# Patient Record
Sex: FEMALE | Race: OTHER | Employment: UNEMPLOYED | URBAN - NONMETROPOLITAN AREA
[De-identification: names, ages, dates, MRNs, and addresses within clinical notes are randomized per-mention and may not be internally consistent; named-entity substitution may affect disease eponyms.]

---

## 2018-05-14 ENCOUNTER — HOSPITAL ENCOUNTER (EMERGENCY)
Age: 28
Discharge: HOME | End: 2018-05-14

## 2018-05-14 ENCOUNTER — APPOINTMENT (OUTPATIENT)
Dept: ULTRASOUND IMAGING | Age: 28
End: 2018-05-14

## 2018-05-14 VITALS
OXYGEN SATURATION: 99 % | HEIGHT: 55 IN | BODY MASS INDEX: 38.18 KG/M2 | RESPIRATION RATE: 16 BRPM | WEIGHT: 165 LBS | DIASTOLIC BLOOD PRESSURE: 69 MMHG | SYSTOLIC BLOOD PRESSURE: 133 MMHG | HEART RATE: 94 BPM | TEMPERATURE: 98.7 F

## 2018-05-14 DIAGNOSIS — K80.20 CALCULUS OF GALLBLADDER WITHOUT CHOLECYSTITIS WITHOUT OBSTRUCTION: Primary | ICD-10-CM

## 2018-05-14 LAB
ALBUMIN SERPL-MCNC: 4.4 G/DL (ref 3.5–5.1)
ALP BLD-CCNC: 68 U/L (ref 38–126)
ALT SERPL-CCNC: 24 U/L (ref 11–66)
ANION GAP SERPL CALCULATED.3IONS-SCNC: 16 MEQ/L (ref 8–16)
ANISOCYTOSIS: ABNORMAL
AST SERPL-CCNC: 21 U/L (ref 5–40)
BACTERIA: ABNORMAL /HPF
BASOPHILIA: SLIGHT
BASOPHILS # BLD: 0.5 %
BASOPHILS ABSOLUTE: 0 THOU/MM3 (ref 0–0.1)
BILIRUB SERPL-MCNC: 0.2 MG/DL (ref 0.3–1.2)
BILIRUBIN DIRECT: < 0.2 MG/DL (ref 0–0.3)
BILIRUBIN URINE: NEGATIVE
BLOOD, URINE: NEGATIVE
BUN BLDV-MCNC: 8 MG/DL (ref 7–22)
CALCIUM SERPL-MCNC: 9 MG/DL (ref 8.5–10.5)
CASTS 2: ABNORMAL /LPF
CASTS UA: ABNORMAL /LPF
CHARACTER, URINE: CLEAR
CHLORIDE BLD-SCNC: 101 MEQ/L (ref 98–111)
CO2: 21 MEQ/L (ref 23–33)
COLOR: YELLOW
CREAT SERPL-MCNC: 0.5 MG/DL (ref 0.4–1.2)
CRYSTALS, UA: ABNORMAL
EOSINOPHIL # BLD: 2.6 %
EOSINOPHILS ABSOLUTE: 0.2 THOU/MM3 (ref 0–0.4)
EPITHELIAL CELLS, UA: ABNORMAL /HPF
GFR SERPL CREATININE-BSD FRML MDRD: > 90 ML/MIN/1.73M2
GLUCOSE BLD-MCNC: 90 MG/DL (ref 70–108)
GLUCOSE URINE: NEGATIVE MG/DL
HCT VFR BLD CALC: 38.4 % (ref 37–47)
HEMOGLOBIN: 12.8 GM/DL (ref 12–16)
KETONES, URINE: NEGATIVE
LEUKOCYTE ESTERASE, URINE: ABNORMAL
LIPASE: 41.2 U/L (ref 5.6–51.3)
LYMPHOCYTES # BLD: 19.8 %
LYMPHOCYTES ABSOLUTE: 1.9 THOU/MM3 (ref 1–4.8)
MCH RBC QN AUTO: 27.1 PG (ref 27–31)
MCHC RBC AUTO-ENTMCNC: 33.2 GM/DL (ref 33–37)
MCV RBC AUTO: 81.7 FL (ref 81–99)
MISCELLANEOUS 2: ABNORMAL
MONOCYTES # BLD: 7.8 %
MONOCYTES ABSOLUTE: 0.7 THOU/MM3 (ref 0.4–1.3)
NITRITE, URINE: NEGATIVE
NUCLEATED RED BLOOD CELLS: 0 /100 WBC
OSMOLALITY CALCULATION: 273.5 MOSMOL/KG (ref 275–300)
PDW BLD-RTO: 17.1 % (ref 11.5–14.5)
PH UA: 6.5
PLATELET # BLD: 256 THOU/MM3 (ref 130–400)
PLATELET ESTIMATE: ADEQUATE
PMV BLD AUTO: 9.7 FL (ref 7.4–10.4)
POIKILOCYTES: SLIGHT
POTASSIUM SERPL-SCNC: 4.1 MEQ/L (ref 3.5–5.2)
PROTEIN UA: NEGATIVE
RBC # BLD: 4.7 MILL/MM3 (ref 4.2–5.4)
RBC URINE: ABNORMAL /HPF
RENAL EPITHELIAL, UA: ABNORMAL
SCAN OF BLOOD SMEAR: NORMAL
SEG NEUTROPHILS: 69.3 %
SEGMENTED NEUTROPHILS ABSOLUTE COUNT: 6.7 THOU/MM3 (ref 1.8–7.7)
SODIUM BLD-SCNC: 138 MEQ/L (ref 135–145)
SPECIFIC GRAVITY, URINE: 1.01 (ref 1–1.03)
TOTAL PROTEIN: 7.5 G/DL (ref 6.1–8)
UROBILINOGEN, URINE: 0.2 EU/DL
WBC # BLD: 9.6 THOU/MM3 (ref 4.8–10.8)
WBC UA: ABNORMAL /HPF
YEAST: ABNORMAL

## 2018-05-14 PROCEDURE — 99283 EMERGENCY DEPT VISIT LOW MDM: CPT

## 2018-05-14 PROCEDURE — 81001 URINALYSIS AUTO W/SCOPE: CPT

## 2018-05-14 PROCEDURE — 87086 URINE CULTURE/COLONY COUNT: CPT

## 2018-05-14 PROCEDURE — 85025 COMPLETE CBC W/AUTO DIFF WBC: CPT

## 2018-05-14 PROCEDURE — 36415 COLL VENOUS BLD VENIPUNCTURE: CPT

## 2018-05-14 PROCEDURE — 82248 BILIRUBIN DIRECT: CPT

## 2018-05-14 PROCEDURE — 83690 ASSAY OF LIPASE: CPT

## 2018-05-14 PROCEDURE — 76705 ECHO EXAM OF ABDOMEN: CPT

## 2018-05-14 PROCEDURE — 80053 COMPREHEN METABOLIC PANEL: CPT

## 2018-05-14 RX ORDER — DICYCLOMINE HYDROCHLORIDE 10 MG/1
10 CAPSULE ORAL EVERY 6 HOURS PRN
Qty: 20 CAPSULE | Refills: 0 | Status: SHIPPED | OUTPATIENT
Start: 2018-05-14 | End: 2018-10-29 | Stop reason: ALTCHOICE

## 2018-05-14 RX ORDER — ONDANSETRON 4 MG/1
4 TABLET, ORALLY DISINTEGRATING ORAL EVERY 8 HOURS PRN
Qty: 20 TABLET | Refills: 0 | Status: SHIPPED | OUTPATIENT
Start: 2018-05-14 | End: 2018-10-29 | Stop reason: ALTCHOICE

## 2018-05-14 ASSESSMENT — ENCOUNTER SYMPTOMS
RHINORRHEA: 0
EYE DISCHARGE: 0
WHEEZING: 0
COUGH: 0
VOMITING: 0
SHORTNESS OF BREATH: 0
BACK PAIN: 0
NAUSEA: 0
DIARRHEA: 0
SORE THROAT: 0

## 2018-05-14 ASSESSMENT — PAIN DESCRIPTION - FREQUENCY: FREQUENCY: INTERMITTENT

## 2018-05-14 ASSESSMENT — PAIN DESCRIPTION - PAIN TYPE: TYPE: ACUTE PAIN

## 2018-05-14 ASSESSMENT — PAIN DESCRIPTION - ORIENTATION: ORIENTATION: RIGHT

## 2018-05-14 ASSESSMENT — PAIN DESCRIPTION - LOCATION: LOCATION: BREAST

## 2018-05-14 ASSESSMENT — PAIN SCALES - GENERAL: PAINLEVEL_OUTOF10: 4

## 2018-05-15 LAB
ORGANISM: ABNORMAL
URINE CULTURE REFLEX: ABNORMAL

## 2018-05-15 ASSESSMENT — ENCOUNTER SYMPTOMS
EYE REDNESS: 0
CONSTIPATION: 0
COLOR CHANGE: 0
ABDOMINAL PAIN: 1

## 2018-10-29 ENCOUNTER — OFFICE VISIT (OUTPATIENT)
Dept: SURGERY | Age: 28
End: 2018-10-29

## 2018-10-29 VITALS
OXYGEN SATURATION: 98 % | DIASTOLIC BLOOD PRESSURE: 60 MMHG | SYSTOLIC BLOOD PRESSURE: 110 MMHG | HEART RATE: 99 BPM | RESPIRATION RATE: 18 BRPM | WEIGHT: 196.8 LBS | TEMPERATURE: 98.2 F | BODY MASS INDEX: 45.55 KG/M2 | HEIGHT: 55 IN

## 2018-10-29 DIAGNOSIS — Z3A.30 30 WEEKS GESTATION OF PREGNANCY: ICD-10-CM

## 2018-10-29 DIAGNOSIS — K80.20 CALCULUS OF GALLBLADDER WITHOUT CHOLECYSTITIS WITHOUT OBSTRUCTION: Primary | ICD-10-CM

## 2018-10-29 PROCEDURE — 99242 OFF/OP CONSLTJ NEW/EST SF 20: CPT | Performed by: SURGERY

## 2018-10-29 RX ORDER — ACETAMINOPHEN 325 MG/1
650 TABLET ORAL EVERY 6 HOURS PRN
COMMUNITY
End: 2019-04-25 | Stop reason: ALTCHOICE

## 2018-10-29 NOTE — LETTER
[address] [facility] Surgery  [facility] [address] [address]  [address] [address]  Phone: [phone]  Fax: [phone]    Mellissa Ace MD        October 30, 2018    Patient: Dimple Chavarria   MR Number: [id number]   YOB: 1990   Date of Visit: 10/29/2018     Dear Dr. Lisa Caceres,    Thank you for the request for consultation for Dimple Chavarria to me for the evaluation of cholelithiasis. Below are the relevant portions of my assessment and plan of care. 1. Calculus of gallbladder without cholecystitis without obstruction    2. 30 weeks gestation of pregnancy           1. Patient is intermittently symptomatic with biliary colic. Only if she eats fatty foods. 2.  Recommend avoidance of triggering foods. 3.  Don't think cholecystectomy is warranted at this time in her pregnancy. 4.  Follow-up surgical clinic approximately 1 month postpartum. 5.  Patient instructed to call our office if develops increasing symptoms of biliary colic. If you have questions, please do not hesitate to call me. I look forward to following Yoselin along with you.     Sincerely,          Mellissa Ace MD

## 2018-10-30 ASSESSMENT — ENCOUNTER SYMPTOMS
BLOOD IN STOOL: 0
TROUBLE SWALLOWING: 0
SHORTNESS OF BREATH: 0
VOMITING: 0
ABDOMINAL PAIN: 0
WHEEZING: 0
COLOR CHANGE: 0
COUGH: 0
NAUSEA: 0
VOICE CHANGE: 0
SORE THROAT: 0

## 2018-10-30 NOTE — PROGRESS NOTES
Kiki Cobian MD   General Surgery  New Patient Evaluation in Office  Pt Name: Nikita Bobby  Date of Birth 1990   Today's Date: 10/29/2018  Medical Record Number: [id number]  Referring Provider: Doyle Elam MD  Primary [facility], APRN - [facility]  Chief Complaint:  Chief Complaint   Patient presents with   [address] [place] [address] Surgical Consult     new pt-ref. Dr. Krish Walsh in Jan- Multiple echogenic foci concerning for gallstones       ASSESSMENT      1. Calculus of gallbladder without cholecystitis without obstruction    2. 30 weeks gestation of pregnancy         PLANS      1. Patient is intermittently symptomatic with biliary colic. Only if she eats fatty foods. 2.  Recommend avoidance of triggering foods. 3.  Don't think cholecystectomy is warranted at this time in her pregnancy. 4.  Follow-up surgical clinic approximately 1 month postpartum. 5.  Patient instructed to call our office if develops increasing symptoms of biliary colic. Rosa Alexander is a 25y.o. year old female who is presenting today in the office for evaluation of cholelithiasis. The patient is 29-30 weeks pregnant with intrauterine pregnancy. She has had some intermittent episodes of what sounds like biliary colic with some epigastric pain and radiation to the right back. Gallbladder ultrasound performed several months ago revealed cholelithiasis but no signs of biliary duct dilation or cholecystitis. She states she has no symptoms if she simply avoids fatty foods. She denies any dark urine or lighter colored bowel movements. She has no significant reflux symptoms. She denies any prior history of hepatitis or jaundice. She denies any abdominal pain in the last 2 weeks. Pain when it occurs describes as pressure type sharp sensation in the epigastrium without real radiation. She denies any prior known history of peptic ulcer disease, pancreatitis or hepatitis.   She states she is gaining RUQ pain worse after eating.       COMPARISON: No prior study.       TECHNIQUE:Real-time transabdominal ultrasound was performed.       FINDINGS:   Liver is echogenic most commonly seen with fatty infiltration. Limited valuation of the pancreas due to overlying bowel gas. Where visualized the pancreas is within normal limits. Gallbladder - 6.0 x 2.0 x 1.6 cm partially contracted gallbladder. Multiple gallstones. Gallbladder Wall - 0.4 cm   Common Duct - 0.5 cm   Hansen's Sign: neg           Impression       Partially contracted gallbladder. Multiple echogenic foci concerning for gallstones. Correlation with serology advised.               **This report has been created using voice recognition software. It may contain minor errors which are inherent in voice recognition technology. **       Final report electronically signed by Dr. Tyra Syed on 5/14/2018 2:32 PM

## 2018-12-18 ENCOUNTER — ANESTHESIA (OUTPATIENT)
Dept: LABOR AND DELIVERY | Age: 28
End: 2018-12-18

## 2018-12-18 ENCOUNTER — HOSPITAL ENCOUNTER (INPATIENT)
Age: 28
LOS: 2 days | Discharge: HOME | End: 2018-12-20
Attending: OBSTETRICS & GYNECOLOGY | Admitting: OBSTETRICS & GYNECOLOGY

## 2018-12-18 ENCOUNTER — ANESTHESIA EVENT (OUTPATIENT)
Dept: LABOR AND DELIVERY | Age: 28
End: 2018-12-18

## 2018-12-18 PROBLEM — O09.90 SUPERVISION OF HIGH-RISK PREGNANCY: Status: ACTIVE | Noted: 2018-12-18

## 2018-12-18 LAB
ABO: NORMAL
AMPHETAMINE+METHAMPHETAMINE URINE SCREEN: NEGATIVE
ANTIBODY SCREEN: NORMAL
BARBITURATE QUANTITATIVE URINE: NEGATIVE
BENZODIAZEPINE QUANTITATIVE URINE: NEGATIVE
CANNABINOID QUANTITATIVE URINE: NEGATIVE
COCAINE METABOLITE QUANTITATIVE URINE: NEGATIVE
ERYTHROCYTE [DISTWIDTH] IN BLOOD BY AUTOMATED COUNT: 19.6 % (ref 11.5–14.5)
ERYTHROCYTE [DISTWIDTH] IN BLOOD BY AUTOMATED COUNT: 56.5 FL (ref 35–45)
HCT VFR BLD CALC: 35.8 % (ref 37–47)
HEMOGLOBIN: 11.5 GM/DL (ref 12–16)
MCH RBC QN AUTO: 26 PG (ref 26–33)
MCHC RBC AUTO-ENTMCNC: 32.1 GM/DL (ref 32.2–35.5)
MCV RBC AUTO: 81 FL (ref 81–99)
OPIATES, URINE: NEGATIVE
OXYCODONE: NEGATIVE
PHENCYCLIDINE QUANTITATIVE URINE: NEGATIVE
PLATELET # BLD: 198 THOU/MM3 (ref 130–400)
PMV BLD AUTO: 13 FL (ref 9.4–12.4)
RBC # BLD: 4.42 MILL/MM3 (ref 4.2–5.4)
RH FACTOR: NORMAL
WBC # BLD: 11.2 THOU/MM3 (ref 4.8–10.8)

## 2018-12-18 PROCEDURE — 2709999900 HC NON-CHARGEABLE SUPPLY

## 2018-12-18 PROCEDURE — 3700000025 ANESTHESIA EPIDURAL BLOCK: Performed by: ANESTHESIOLOGY

## 2018-12-18 PROCEDURE — 86900 BLOOD TYPING SEROLOGIC ABO: CPT

## 2018-12-18 PROCEDURE — 6360000002 HC RX W HCPCS: Performed by: REGISTERED NURSE

## 2018-12-18 PROCEDURE — 85027 COMPLETE CBC AUTOMATED: CPT

## 2018-12-18 PROCEDURE — 2500000003 HC RX 250 WO HCPCS: Performed by: ANESTHESIOLOGY

## 2018-12-18 PROCEDURE — 4A1HXCZ MONITORING OF PRODUCTS OF CONCEPTION, CARDIAC RATE, EXTERNAL APPROACH: ICD-10-PCS | Performed by: OBSTETRICS & GYNECOLOGY

## 2018-12-18 PROCEDURE — 7200000001 HC VAGINAL DELIVERY

## 2018-12-18 PROCEDURE — 86850 RBC ANTIBODY SCREEN: CPT

## 2018-12-18 PROCEDURE — 3E033VJ INTRODUCTION OF OTHER HORMONE INTO PERIPHERAL VEIN, PERCUTANEOUS APPROACH: ICD-10-PCS | Performed by: OBSTETRICS & GYNECOLOGY

## 2018-12-18 PROCEDURE — 6370000000 HC RX 637 (ALT 250 FOR IP): Performed by: OBSTETRICS & GYNECOLOGY

## 2018-12-18 PROCEDURE — 88307 TISSUE EXAM BY PATHOLOGIST: CPT

## 2018-12-18 PROCEDURE — 2580000003 HC RX 258: Performed by: OBSTETRICS & GYNECOLOGY

## 2018-12-18 PROCEDURE — 0KQM0ZZ REPAIR PERINEUM MUSCLE, OPEN APPROACH: ICD-10-PCS | Performed by: OBSTETRICS & GYNECOLOGY

## 2018-12-18 PROCEDURE — 86592 SYPHILIS TEST NON-TREP QUAL: CPT

## 2018-12-18 PROCEDURE — 6360000002 HC RX W HCPCS

## 2018-12-18 PROCEDURE — 80307 DRUG TEST PRSMV CHEM ANLYZR: CPT

## 2018-12-18 PROCEDURE — 86901 BLOOD TYPING SEROLOGIC RH(D): CPT

## 2018-12-18 PROCEDURE — 1220000000 HC SEMI PRIVATE OB R&B

## 2018-12-18 PROCEDURE — 6360000002 HC RX W HCPCS: Performed by: OBSTETRICS & GYNECOLOGY

## 2018-12-18 PROCEDURE — 36415 COLL VENOUS BLD VENIPUNCTURE: CPT

## 2018-12-18 RX ORDER — FENTANYL CITRATE 50 UG/ML
INJECTION, SOLUTION INTRAMUSCULAR; INTRAVENOUS
Status: COMPLETED
Start: 2018-12-18 | End: 2018-12-18

## 2018-12-18 RX ORDER — TERBUTALINE SULFATE 1 MG/ML
0.25 INJECTION, SOLUTION SUBCUTANEOUS ONCE
Status: DISCONTINUED | OUTPATIENT
Start: 2018-12-18 | End: 2018-12-18 | Stop reason: HOSPADM

## 2018-12-18 RX ORDER — METOCLOPRAMIDE HYDROCHLORIDE 5 MG/ML
INJECTION INTRAMUSCULAR; INTRAVENOUS
Status: DISCONTINUED
Start: 2018-12-18 | End: 2018-12-19

## 2018-12-18 RX ORDER — CARBOPROST TROMETHAMINE 250 UG/ML
250 INJECTION, SOLUTION INTRAMUSCULAR PRN
Status: DISCONTINUED | OUTPATIENT
Start: 2018-12-18 | End: 2018-12-18 | Stop reason: HOSPADM

## 2018-12-18 RX ORDER — NALOXONE HYDROCHLORIDE 0.4 MG/ML
0.4 INJECTION, SOLUTION INTRAMUSCULAR; INTRAVENOUS; SUBCUTANEOUS PRN
Status: DISCONTINUED | OUTPATIENT
Start: 2018-12-18 | End: 2018-12-18 | Stop reason: HOSPADM

## 2018-12-18 RX ORDER — ACETAMINOPHEN 325 MG/1
650 TABLET ORAL EVERY 4 HOURS PRN
Status: DISCONTINUED | OUTPATIENT
Start: 2018-12-18 | End: 2018-12-18 | Stop reason: HOSPADM

## 2018-12-18 RX ORDER — SODIUM CHLORIDE, SODIUM LACTATE, POTASSIUM CHLORIDE, CALCIUM CHLORIDE 600; 310; 30; 20 MG/100ML; MG/100ML; MG/100ML; MG/100ML
INJECTION, SOLUTION INTRAVENOUS CONTINUOUS
Status: DISCONTINUED | OUTPATIENT
Start: 2018-12-18 | End: 2018-12-19

## 2018-12-18 RX ORDER — MISOPROSTOL 200 UG/1
1000 TABLET ORAL PRN
Status: DISCONTINUED | OUTPATIENT
Start: 2018-12-18 | End: 2018-12-18 | Stop reason: HOSPADM

## 2018-12-18 RX ORDER — FENTANYL CITRATE 50 UG/ML
INJECTION, SOLUTION INTRAMUSCULAR; INTRAVENOUS PRN
Status: DISCONTINUED | OUTPATIENT
Start: 2018-12-18 | End: 2018-12-18 | Stop reason: SDUPTHER

## 2018-12-18 RX ORDER — ONDANSETRON 2 MG/ML
8 INJECTION INTRAMUSCULAR; INTRAVENOUS EVERY 6 HOURS PRN
Status: DISCONTINUED | OUTPATIENT
Start: 2018-12-18 | End: 2018-12-18 | Stop reason: HOSPADM

## 2018-12-18 RX ORDER — NALBUPHINE HCL 10 MG/ML
5 AMPUL (ML) INJECTION EVERY 4 HOURS PRN
Status: DISCONTINUED | OUTPATIENT
Start: 2018-12-18 | End: 2018-12-18 | Stop reason: HOSPADM

## 2018-12-18 RX ORDER — ROPIVACAINE HYDROCHLORIDE 2 MG/ML
INJECTION, SOLUTION EPIDURAL; INFILTRATION; PERINEURAL
Status: DISCONTINUED
Start: 2018-12-18 | End: 2018-12-19

## 2018-12-18 RX ORDER — DIPHENHYDRAMINE HYDROCHLORIDE 50 MG/ML
25 INJECTION INTRAMUSCULAR; INTRAVENOUS EVERY 4 HOURS PRN
Status: DISCONTINUED | OUTPATIENT
Start: 2018-12-18 | End: 2018-12-18 | Stop reason: HOSPADM

## 2018-12-18 RX ORDER — ONDANSETRON 2 MG/ML
4 INJECTION INTRAMUSCULAR; INTRAVENOUS EVERY 6 HOURS PRN
Status: DISCONTINUED | OUTPATIENT
Start: 2018-12-18 | End: 2018-12-18 | Stop reason: HOSPADM

## 2018-12-18 RX ORDER — METHYLERGONOVINE MALEATE 0.2 MG/ML
200 INJECTION INTRAVENOUS PRN
Status: DISCONTINUED | OUTPATIENT
Start: 2018-12-18 | End: 2018-12-18 | Stop reason: HOSPADM

## 2018-12-18 RX ORDER — SEVOFLURANE 250 ML/250ML
1 LIQUID RESPIRATORY (INHALATION) CONTINUOUS PRN
Status: DISCONTINUED | OUTPATIENT
Start: 2018-12-18 | End: 2018-12-18 | Stop reason: HOSPADM

## 2018-12-18 RX ORDER — BUTORPHANOL TARTRATE 1 MG/ML
1 INJECTION, SOLUTION INTRAMUSCULAR; INTRAVENOUS
Status: DISCONTINUED | OUTPATIENT
Start: 2018-12-18 | End: 2018-12-18 | Stop reason: HOSPADM

## 2018-12-18 RX ORDER — IBUPROFEN 800 MG/1
800 TABLET ORAL EVERY 8 HOURS
Status: DISCONTINUED | OUTPATIENT
Start: 2018-12-18 | End: 2018-12-20 | Stop reason: HOSPADM

## 2018-12-18 RX ORDER — LIDOCAINE HYDROCHLORIDE 10 MG/ML
30 INJECTION, SOLUTION EPIDURAL; INFILTRATION; INTRACAUDAL; PERINEURAL PRN
Status: DISCONTINUED | OUTPATIENT
Start: 2018-12-18 | End: 2018-12-18 | Stop reason: HOSPADM

## 2018-12-18 RX ORDER — EPHEDRINE SULFATE/0.9% NACL/PF 50 MG/5 ML
SYRINGE (ML) INTRAVENOUS
Status: DISCONTINUED
Start: 2018-12-18 | End: 2018-12-19

## 2018-12-18 RX ADMIN — ONDANSETRON 8 MG: 2 INJECTION INTRAMUSCULAR; INTRAVENOUS at 15:35

## 2018-12-18 RX ADMIN — SODIUM CHLORIDE, POTASSIUM CHLORIDE, SODIUM LACTATE AND CALCIUM CHLORIDE: 600; 310; 30; 20 INJECTION, SOLUTION INTRAVENOUS at 14:00

## 2018-12-18 RX ADMIN — SODIUM CHLORIDE, POTASSIUM CHLORIDE, SODIUM LACTATE AND CALCIUM CHLORIDE: 600; 310; 30; 20 INJECTION, SOLUTION INTRAVENOUS at 15:00

## 2018-12-18 RX ADMIN — IBUPROFEN 800 MG: 800 TABLET ORAL at 23:02

## 2018-12-18 RX ADMIN — ACETAMINOPHEN 650 MG: 325 TABLET ORAL at 19:53

## 2018-12-18 RX ADMIN — FENTANYL CITRATE 100 MCG: 50 INJECTION INTRAMUSCULAR; INTRAVENOUS at 15:36

## 2018-12-18 RX ADMIN — DIPHENHYDRAMINE HYDROCHLORIDE 25 MG: 50 INJECTION, SOLUTION INTRAMUSCULAR; INTRAVENOUS at 20:02

## 2018-12-18 RX ADMIN — SODIUM CHLORIDE, POTASSIUM CHLORIDE, SODIUM LACTATE AND CALCIUM CHLORIDE: 600; 310; 30; 20 INJECTION, SOLUTION INTRAVENOUS at 09:10

## 2018-12-18 RX ADMIN — Medication 6 ML/HR: at 15:34

## 2018-12-18 RX ADMIN — BUTORPHANOL TARTRATE 1 MG: 1 INJECTION, SOLUTION INTRAMUSCULAR; INTRAVENOUS at 18:55

## 2018-12-18 RX ADMIN — Medication 1 MILLI-UNITS/MIN: at 09:30

## 2018-12-18 ASSESSMENT — PAIN DESCRIPTION - PAIN TYPE: TYPE: ACUTE PAIN

## 2018-12-18 ASSESSMENT — PAIN DESCRIPTION - DESCRIPTORS: DESCRIPTORS: CRAMPING

## 2018-12-18 ASSESSMENT — PAIN SCALES - GENERAL
PAINLEVEL_OUTOF10: 8
PAINLEVEL_OUTOF10: 8
PAINLEVEL_OUTOF10: 5
PAINLEVEL_OUTOF10: 9

## 2018-12-18 ASSESSMENT — PAIN DESCRIPTION - FREQUENCY: FREQUENCY: INTERMITTENT

## 2018-12-18 ASSESSMENT — PAIN DESCRIPTION - PROGRESSION: CLINICAL_PROGRESSION: NOT CHANGED

## 2018-12-18 ASSESSMENT — PAIN DESCRIPTION - LOCATION: LOCATION: ABDOMEN

## 2018-12-18 NOTE — ANESTHESIA PRE PROCEDURE
Maryana Velazquez MD        carboprost [facility]) injection 250 mcg  250 mcg Intramuscular PRN Vaughn De La Vega MD        misoprostol (CYTOTEC) tablet 1,000 mcg  1,000 mcg Rectal PRN Elease Argue, MD        witch hazel-glycerin (TUCKS) pad   Topical PRN Vaughn De La Vega MD        benzocaine-menthol (DERMOPLAST) 20-0.5 % spray   Topical PRN Vaughn De La Vega MD        ropivacaine (NAROPIN) 0.2% injection 0.2%             fentaNYL (SUBLIMAZE) 100 MCG/2ML injection             metoclopramide (REGLAN) 5 MG/ML injection             fentaNYL 750 mcg, ropivacaine 0.1% in sodium chloride 0.9% 265mL (OB) epidural  18 mL/hr Epidural Continuous Ribeiro Lemon Heitmeyer, DO 6 mL/hr at 12/18/18 1534 6 mL/hr at 12/18/18 1534    naloxone [facility]) injection 0.4 mg  0.4 mg Intravenous PRN Ribeiro Lemon Heitmeyer, DO        nalbuphine (NUBAIN) injection 5 mg  5 mg Intravenous Q4H PRN Ribeiro Lemon Heitmeyer, DO        ondansetron (ZOFRAN) injection 4 mg  4 mg Intravenous Q6H PRN Ribeiro Lemon Heitmeyer, DO        ePHEDrine 50-0.9 MG/5ML-% injection                Allergies:  No Known Allergies    Problem List:  There is no problem list on file for this patient. Past Medical History:        Diagnosis Date    Cholelithiases        Past Surgical History:  History reviewed. No pertinent surgical history.     Social History:    Social History   Substance Use Topics    Smoking status: Former Smoker     Packs/day: 0.50     Types: Cigarettes    Smokeless tobacco: Never Used    Alcohol use No                                Counseling given: Not Answered      Vital Signs (Current):   Vitals:    12/18/18 1230 12/18/18 1330 12/18/18 1355 12/18/18 1400   BP: 138/81 (!) 144/81  (!) 147/83   Pulse: 91 87  84   Resp:       Temp: 98 °F (36.7 °C)  97.9 °F (36.6 °C)    TempSrc:       Weight:       Height:                                                  BP Readings from Last 3 Encounters:   12/18/18 (!) 147/83   10/29/18 110/60   05/14/18 133/69       NPO Status: Time of last liquid consumption: 2300                        Time of last solid consumption: 2300                        Date of last liquid consumption: 12/17/18                        Date of last solid food consumption: 12/17/18    BMI:   Wt Readings from Last 3 Encounters:   12/18/18 209 lb (94.8 kg)   10/29/18 196 lb 12.8 oz (89.3 kg)   05/14/18 165 lb (74.8 kg)     Body mass index is 42.21 kg/m². CBC:   Lab Results   Component Value Date    WBC 11.2 12/18/2018    RBC 4.42 12/18/2018    RBC 4.93 06/27/2018    HGB 11.5 12/18/2018    HCT 35.8 12/18/2018    MCV 81.0 12/18/2018    RDW 15.2 06/27/2018     12/18/2018       CMP:   Lab Results   Component Value Date     05/14/2018    K 4.1 05/14/2018     05/14/2018    CO2 21 05/14/2018    BUN 8 05/14/2018    CREATININE 0.5 05/14/2018    LABGLOM >90 05/14/2018    GLUCOSE 90 05/14/2018    PROT 7.5 05/14/2018    CALCIUM 9.0 05/14/2018    BILITOT 0.2 05/14/2018    ALKPHOS 68 05/14/2018    AST 21 05/14/2018    ALT 24 05/14/2018       POC Tests: No results for input(s): POCGLU, POCNA, POCK, POCCL, POCBUN, POCHEMO, POCHCT in the last 72 hours.     Coags: No results found for: PROTIME, INR, APTT    HCG (If Applicable):   Lab Results   Component Value Date    PREGTESTUR NEGATIVE 08/07/2014    PREGSERUM NEGATIVE 10/30/2011        ABGs: No results found for: PHART, PO2ART, IAD7JGQ, BST9ZIT, BEART, B0LEBMBC     Type & Screen (If Applicable):  Lab Results   Component Value Date    LABABO O 06/27/2018    [address] POS 12/18/2018       Anesthesia Evaluation  Patient summary reviewed and Nursing notes reviewed no history of anesthetic complications:   Airway: Mallampati: III  TM distance: >3 FB   Neck ROM: limited  Mouth opening: > = 3 FB Dental:          Pulmonary:Negative Pulmonary ROS and normal exam  breath sounds clear to auscultation                             Cardiovascular:Negative CV ROS  Exercise tolerance: good (>4 METS),

## 2018-12-19 LAB — RPR: NONREACTIVE

## 2018-12-19 PROCEDURE — 2709999900 HC NON-CHARGEABLE SUPPLY

## 2018-12-19 PROCEDURE — 6370000000 HC RX 637 (ALT 250 FOR IP): Performed by: OBSTETRICS & GYNECOLOGY

## 2018-12-19 PROCEDURE — 1220000000 HC SEMI PRIVATE OB R&B

## 2018-12-19 RX ORDER — CARBOPROST TROMETHAMINE 250 UG/ML
250 INJECTION, SOLUTION INTRAMUSCULAR
Status: DISPENSED | OUTPATIENT
Start: 2018-12-19 | End: 2018-12-19

## 2018-12-19 RX ORDER — FERROUS SULFATE 325(65) MG
325 TABLET ORAL
Status: DISCONTINUED | OUTPATIENT
Start: 2018-12-19 | End: 2018-12-20 | Stop reason: HOSPADM

## 2018-12-19 RX ORDER — ACETAMINOPHEN 325 MG/1
650 TABLET ORAL EVERY 4 HOURS PRN
Status: DISCONTINUED | OUTPATIENT
Start: 2018-12-19 | End: 2018-12-20 | Stop reason: HOSPADM

## 2018-12-19 RX ORDER — SODIUM CHLORIDE 0.9 % (FLUSH) 0.9 %
10 SYRINGE (ML) INJECTION PRN
Status: DISCONTINUED | OUTPATIENT
Start: 2018-12-19 | End: 2018-12-20 | Stop reason: HOSPADM

## 2018-12-19 RX ORDER — LANOLIN 100 %
OINTMENT (GRAM) TOPICAL PRN
Status: DISCONTINUED | OUTPATIENT
Start: 2018-12-19 | End: 2018-12-20 | Stop reason: HOSPADM

## 2018-12-19 RX ORDER — IBUPROFEN 800 MG/1
800 TABLET ORAL EVERY 8 HOURS PRN
Status: DISCONTINUED | OUTPATIENT
Start: 2018-12-19 | End: 2018-12-19 | Stop reason: SDUPTHER

## 2018-12-19 RX ORDER — ONDANSETRON 2 MG/ML
8 INJECTION INTRAMUSCULAR; INTRAVENOUS EVERY 8 HOURS PRN
Status: DISCONTINUED | OUTPATIENT
Start: 2018-12-19 | End: 2018-12-20 | Stop reason: HOSPADM

## 2018-12-19 RX ORDER — HYDROCODONE BITARTRATE AND ACETAMINOPHEN 5; 325 MG/1; MG/1
2 TABLET ORAL EVERY 4 HOURS PRN
Status: DISCONTINUED | OUTPATIENT
Start: 2018-12-19 | End: 2018-12-20 | Stop reason: HOSPADM

## 2018-12-19 RX ORDER — METHYLERGONOVINE MALEATE 0.2 MG/ML
200 INJECTION INTRAVENOUS PRN
Status: DISCONTINUED | OUTPATIENT
Start: 2018-12-19 | End: 2018-12-20 | Stop reason: HOSPADM

## 2018-12-19 RX ORDER — SODIUM CHLORIDE 0.9 % (FLUSH) 0.9 %
10 SYRINGE (ML) INJECTION EVERY 12 HOURS SCHEDULED
Status: DISCONTINUED | OUTPATIENT
Start: 2018-12-19 | End: 2018-12-20 | Stop reason: HOSPADM

## 2018-12-19 RX ORDER — ZOLPIDEM TARTRATE 10 MG/1
10 TABLET ORAL NIGHTLY PRN
Status: DISCONTINUED | OUTPATIENT
Start: 2018-12-19 | End: 2018-12-20 | Stop reason: HOSPADM

## 2018-12-19 RX ORDER — DIPHENHYDRAMINE HCL 25 MG
25 TABLET ORAL EVERY 6 HOURS PRN
Status: DISCONTINUED | OUTPATIENT
Start: 2018-12-19 | End: 2018-12-20 | Stop reason: HOSPADM

## 2018-12-19 RX ORDER — MORPHINE SULFATE 4 MG/ML
4 INJECTION, SOLUTION INTRAMUSCULAR; INTRAVENOUS
Status: DISCONTINUED | OUTPATIENT
Start: 2018-12-19 | End: 2018-12-20 | Stop reason: HOSPADM

## 2018-12-19 RX ORDER — DOCUSATE SODIUM 100 MG/1
100 CAPSULE, LIQUID FILLED ORAL 2 TIMES DAILY
Status: DISCONTINUED | OUTPATIENT
Start: 2018-12-19 | End: 2018-12-20 | Stop reason: HOSPADM

## 2018-12-19 RX ORDER — MORPHINE SULFATE 4 MG/ML
2 INJECTION, SOLUTION INTRAMUSCULAR; INTRAVENOUS
Status: DISCONTINUED | OUTPATIENT
Start: 2018-12-19 | End: 2018-12-20 | Stop reason: HOSPADM

## 2018-12-19 RX ORDER — ONDANSETRON 4 MG/1
4 TABLET, ORALLY DISINTEGRATING ORAL EVERY 6 HOURS PRN
Status: DISCONTINUED | OUTPATIENT
Start: 2018-12-19 | End: 2018-12-20 | Stop reason: HOSPADM

## 2018-12-19 RX ADMIN — IBUPROFEN 800 MG: 800 TABLET ORAL at 16:52

## 2018-12-19 RX ADMIN — HYDROCODONE BITARTRATE AND ACETAMINOPHEN 2 TABLET: 5; 325 TABLET ORAL at 01:05

## 2018-12-19 RX ADMIN — ACETAMINOPHEN 650 MG: 325 TABLET ORAL at 12:19

## 2018-12-19 RX ADMIN — DOCUSATE SODIUM 100 MG: 100 CAPSULE, LIQUID FILLED ORAL at 20:01

## 2018-12-19 RX ADMIN — DOCUSATE SODIUM 100 MG: 100 CAPSULE, LIQUID FILLED ORAL at 08:36

## 2018-12-19 RX ADMIN — HYDROCODONE BITARTRATE AND ACETAMINOPHEN 2 TABLET: 5; 325 TABLET ORAL at 05:47

## 2018-12-19 RX ADMIN — ACETAMINOPHEN 650 MG: 325 TABLET ORAL at 20:01

## 2018-12-19 RX ADMIN — IBUPROFEN 800 MG: 800 TABLET ORAL at 08:35

## 2018-12-19 ASSESSMENT — PAIN DESCRIPTION - PROGRESSION: CLINICAL_PROGRESSION: GRADUALLY IMPROVING

## 2018-12-19 ASSESSMENT — PAIN DESCRIPTION - LOCATION: LOCATION: ABDOMEN

## 2018-12-19 ASSESSMENT — PAIN SCALES - GENERAL
PAINLEVEL_OUTOF10: 8
PAINLEVEL_OUTOF10: 6
PAINLEVEL_OUTOF10: 5
PAINLEVEL_OUTOF10: 6
PAINLEVEL_OUTOF10: 5
PAINLEVEL_OUTOF10: 5

## 2018-12-19 ASSESSMENT — PAIN DESCRIPTION - FREQUENCY: FREQUENCY: INTERMITTENT

## 2018-12-19 ASSESSMENT — PAIN DESCRIPTION - DESCRIPTORS: DESCRIPTORS: CRAMPING

## 2018-12-19 ASSESSMENT — PAIN DESCRIPTION - PAIN TYPE: TYPE: ACUTE PAIN

## 2018-12-19 NOTE — FLOWSHEET NOTE
Infant to room, POC discussed with mother and father.  show completed. Parents verbalize understanding and denies questions.

## 2018-12-19 NOTE — PLAN OF CARE
Problem: Fluid Volume - Imbalance:  Goal: Absence of postpartum hemorrhage signs and symptoms  Absence of postpartum hemorrhage signs and symptoms   Outcome: Ongoing  Small amount of lochia, no clots reported or noted    Problem: Pain - Acute:  Goal: Pain level will decrease  Pain level will decrease    Motrin given for back pain    Problem: Discharge Planning:  Goal: Discharged to appropriate level of care  Discharged to appropriate level of care   Outcome: Ongoing  No discharge today will continue postpartum care    Problem: Constipation:  Goal: Bowel elimination is within specified parameters  Bowel elimination is within specified parameters   Outcome: Ongoing  No Bm passing gas    Problem: Infection - Risk of, Puerperal Infection:  Goal: Will show no infection signs and symptoms  Will show no infection signs and symptoms   Outcome: Ongoing  Vitals stable    Problem: Mood - Altered:  Goal: Mood stable  Mood stable   Outcome: Ongoing  Stable mood expresses needs    Problem: Pain:  Goal: Control of acute pain  Control of acute pain   Outcome: Ongoing  Motrin decreasing pain  Goal: Control of chronic pain  Control of chronic pain   Outcome: Completed Date Met: 12/19/18  n/a  Goal: Pain level will decrease  Pain level will decrease    Motrin given for back pain    Comments: Care plan reviewed with patient and SO. Patient and SO verbalize understanding of the plan of care and contribute to goal setting.

## 2018-12-19 NOTE — PROGRESS NOTES
Department of Obstetrics and Gynecology  Labor and Delivery  Attending Post Partum Progress Note    PPD #1    SUBJECTIVE: Feeling well. No complaints. OBJECTIVE:     Vitals:  BP (!) 107/58   Pulse 81   Temp 98.5 °F (36.9 °C) (Oral)   Resp 16   Ht 4' 11\" (1.499 m)   Wt 209 lb (94.8 kg)   LMP 04/12/2018   SpO2 98%   Breastfeeding? Unknown   BMI 42.21 kg/m²     Uterus:  normal size, well involuted, firm, non-tender    DATA:    Hemoglobin:   Lab Results   Component Value Date    HGB 11.5 12/18/2018       ASSESSMENT & PLAN:  Doing well. Anticipate home on post partum day #2.     Andrew Salazar 12/19/2018 Modified Advancement Flap Text: The defect edges were debeveled with a #15 scalpel blade.  Given the location of the defect, shape of the defect and the proximity to free margins a modified advancement flap was deemed most appropriate.  Using a sterile surgical marker, an appropriate advancement flap was drawn incorporating the defect and placing the expected incisions within the relaxed skin tension lines where possible.    The area thus outlined was incised deep to adipose tissue with a #15 scalpel blade.  The skin margins were undermined to an appropriate distance in all directions utilizing iris scissors.

## 2018-12-19 NOTE — FLOWSHEET NOTE
Dr. Sushma Bey updated informed pt 9cm uncomfortable epidural is still not infusing from spinal that was done earlier in the day, Dr. Amy Bergman stated he did not want to restart to epidural, stadol did not improve, requested Dr. Sushma Bey to come for delivery, Dr. Zurdo Montana ok to use Nitrous Oxide, and to Call when pt is closer to Delivery.

## 2018-12-20 VITALS
SYSTOLIC BLOOD PRESSURE: 106 MMHG | HEART RATE: 91 BPM | BODY MASS INDEX: 42.13 KG/M2 | HEIGHT: 59 IN | OXYGEN SATURATION: 98 % | RESPIRATION RATE: 16 BRPM | TEMPERATURE: 97.9 F | WEIGHT: 209 LBS | DIASTOLIC BLOOD PRESSURE: 56 MMHG

## 2018-12-20 LAB
HCT VFR BLD CALC: 30.9 % (ref 37–47)
HEMOGLOBIN: 9.8 GM/DL (ref 12–16)

## 2018-12-20 PROCEDURE — 85018 HEMOGLOBIN: CPT

## 2018-12-20 PROCEDURE — 6370000000 HC RX 637 (ALT 250 FOR IP): Performed by: OBSTETRICS & GYNECOLOGY

## 2018-12-20 PROCEDURE — 36415 COLL VENOUS BLD VENIPUNCTURE: CPT

## 2018-12-20 PROCEDURE — 85014 HEMATOCRIT: CPT

## 2018-12-20 PROCEDURE — 2709999900 HC NON-CHARGEABLE SUPPLY

## 2018-12-20 RX ORDER — DIAPER,BRIEF,INFANT-TODD,DISP
EACH MISCELLANEOUS 2 TIMES DAILY
Status: DISCONTINUED | OUTPATIENT
Start: 2018-12-20 | End: 2018-12-20 | Stop reason: HOSPADM

## 2018-12-20 RX ADMIN — DOCUSATE SODIUM 100 MG: 100 CAPSULE, LIQUID FILLED ORAL at 08:29

## 2018-12-20 RX ADMIN — HYDROCORTISONE: 1 CREAM TOPICAL at 11:14

## 2018-12-20 RX ADMIN — ACETAMINOPHEN 650 MG: 325 TABLET ORAL at 13:53

## 2018-12-20 RX ADMIN — IBUPROFEN 800 MG: 800 TABLET ORAL at 01:32

## 2018-12-20 RX ADMIN — DIPHENHYDRAMINE HCL 25 MG: 25 TABLET ORAL at 01:18

## 2018-12-20 RX ADMIN — IBUPROFEN 800 MG: 800 TABLET ORAL at 11:35

## 2018-12-20 RX ADMIN — ACETAMINOPHEN 650 MG: 325 TABLET ORAL at 08:29

## 2018-12-20 ASSESSMENT — PAIN SCALES - GENERAL
PAINLEVEL_OUTOF10: 6
PAINLEVEL_OUTOF10: 6
PAINLEVEL_OUTOF10: 3
PAINLEVEL_OUTOF10: 3

## 2018-12-20 NOTE — DISCHARGE SUMMARY
Vaginal Delivery Discharge Summary    Gestational Age:36w5d    Antepartum complications: Oligohydramnios    Admission date: 2018  8:55 AM      Type of Delivery:      [place] Data  Information for the patient's :  Asya Casey [address] 89 [[id number]]   female  Birth Weight: 5 lb 14.5 oz (2.68 kg)      Labs: CBC   Lab Results   Component Value Date    HGB 11.5 (L) 2018    HCT 35.8 (L) 2018        Intrapartum complications: None    Postpartum complications: none    The patient is ambulating well. The patient is tolerating a normal diet. Patient Instructions: Activity: activity as tolerated and no sex for 6 weeks  Diet: regular  Wound Care: as directed    Discharge Information  Current Discharge Medication List      CONTINUE these medications which have NOT CHANGED    Details   IRON PO Take by mouth daily      Prenatal Vit-Fe Fumarate-FA (PRENATAL PO) Take by mouth daily      acetaminophen (TYLENOL) 325 MG tablet Take 650 mg by mouth every 6 hours as needed for Pain             No discharge procedures on file.     Plan:   Follow up in 5 week(s)    Electronically signed by Perez Castillo MD on 2018 at 8:09 AM

## 2018-12-20 NOTE — PLAN OF CARE
Problem: Fluid Volume - Imbalance:  Goal: Absence of postpartum hemorrhage signs and symptoms  Absence of postpartum hemorrhage signs and symptoms   Outcome: Ongoing  Small amount of lochia, no clots reported    Problem: Pain - Acute:  Goal: Pain level will decrease  Pain level will decrease    Outcome: Ongoing  Pain level controlled    Problem: Discharge Planning:  Goal: Discharged to appropriate level of care  Discharged to appropriate level of care   Outcome: Ongoing  Discharged to home today    Problem: Constipation:  Goal: Bowel elimination is within specified parameters  Bowel elimination is within specified parameters   Outcome: Ongoing  No bm,  passing gas    Problem: Infection - Risk of, Puerperal Infection:  Goal: Will show no infection signs and symptoms  Will show no infection signs and symptoms   Outcome: Ongoing  Vitals stable    Problem: Mood - Altered:  Goal: Mood stable  Mood stable   Outcome: Ongoing  Stable mood expresses needs    Problem: Pain:  Goal: Control of acute pain  Control of acute pain   Outcome: Ongoing  Pain level controlled  Goal: Pain level will decrease  Pain level will decrease    Outcome: Ongoing  Pain level controlled    Comments: Care plan reviewed with patient. Patient  verbalizes understanding of the plan of care and contribute to goal setting.

## 2019-02-04 ENCOUNTER — OFFICE VISIT (OUTPATIENT)
Dept: SURGERY | Age: 29
End: 2019-02-04

## 2019-02-04 VITALS
OXYGEN SATURATION: 98 % | DIASTOLIC BLOOD PRESSURE: 70 MMHG | SYSTOLIC BLOOD PRESSURE: 120 MMHG | TEMPERATURE: 98 F | WEIGHT: 194 LBS | HEART RATE: 61 BPM | HEIGHT: 59 IN | RESPIRATION RATE: 16 BRPM | BODY MASS INDEX: 39.11 KG/M2

## 2019-02-04 DIAGNOSIS — K80.50 RECURRENT BILIARY COLIC: ICD-10-CM

## 2019-02-04 DIAGNOSIS — K80.20 CALCULUS OF GALLBLADDER WITHOUT CHOLECYSTITIS WITHOUT OBSTRUCTION: Primary | ICD-10-CM

## 2019-02-04 PROCEDURE — 99214 OFFICE O/P EST MOD 30 MIN: CPT | Performed by: SURGERY

## 2019-02-04 RX ORDER — IBUPROFEN 200 MG
200 TABLET ORAL EVERY 6 HOURS PRN
Status: ON HOLD | COMMUNITY
End: 2020-11-08

## 2019-02-04 ASSESSMENT — ENCOUNTER SYMPTOMS
SORE THROAT: 0
COLOR CHANGE: 0
TROUBLE SWALLOWING: 0
BLOOD IN STOOL: 0
VOICE CHANGE: 0
NAUSEA: 0
COUGH: 0
SHORTNESS OF BREATH: 0
WHEEZING: 0
VOMITING: 0

## 2019-02-05 ASSESSMENT — ENCOUNTER SYMPTOMS
ABDOMINAL PAIN: 1
BACK PAIN: 1

## 2019-02-18 ENCOUNTER — HOSPITAL ENCOUNTER (OUTPATIENT)
Age: 29
Discharge: HOME | End: 2019-02-18

## 2019-02-18 DIAGNOSIS — K80.20 CALCULUS OF GALLBLADDER WITHOUT CHOLECYSTITIS WITHOUT OBSTRUCTION: ICD-10-CM

## 2019-02-18 LAB
ALBUMIN SERPL-MCNC: 4.7 G/DL (ref 3.5–5.1)
ALP BLD-CCNC: 113 U/L (ref 38–126)
ALT SERPL-CCNC: 244 U/L (ref 11–66)
AST SERPL-CCNC: 134 U/L (ref 5–40)
BILIRUB SERPL-MCNC: 0.5 MG/DL (ref 0.3–1.2)
BILIRUBIN DIRECT: < 0.2 MG/DL (ref 0–0.3)
HCT VFR BLD CALC: 38.6 % (ref 37–47)
HEMOGLOBIN: 12.4 GM/DL (ref 12–16)
TOTAL PROTEIN: 7.9 G/DL (ref 6.1–8)

## 2019-02-18 PROCEDURE — 85014 HEMATOCRIT: CPT

## 2019-02-18 PROCEDURE — 80076 HEPATIC FUNCTION PANEL: CPT

## 2019-02-18 PROCEDURE — 85018 HEMOGLOBIN: CPT

## 2019-02-18 PROCEDURE — 36415 COLL VENOUS BLD VENIPUNCTURE: CPT

## 2019-03-28 ENCOUNTER — OFFICE VISIT (OUTPATIENT)
Dept: SURGERY | Age: 29
End: 2019-03-28

## 2019-03-28 VITALS
RESPIRATION RATE: 18 BRPM | BODY MASS INDEX: 40.09 KG/M2 | OXYGEN SATURATION: 98 % | DIASTOLIC BLOOD PRESSURE: 76 MMHG | HEIGHT: 58 IN | SYSTOLIC BLOOD PRESSURE: 116 MMHG | TEMPERATURE: 97.9 F | WEIGHT: 191 LBS | HEART RATE: 72 BPM

## 2019-03-28 DIAGNOSIS — K80.20 CALCULUS OF GALLBLADDER WITHOUT CHOLECYSTITIS WITHOUT OBSTRUCTION: Primary | ICD-10-CM

## 2019-03-28 DIAGNOSIS — K80.50 RECURRENT BILIARY COLIC: ICD-10-CM

## 2019-03-28 PROCEDURE — 99213 OFFICE O/P EST LOW 20 MIN: CPT | Performed by: SURGERY

## 2019-03-28 ASSESSMENT — ENCOUNTER SYMPTOMS
SORE THROAT: 0
BLOOD IN STOOL: 0
COLOR CHANGE: 0
VOMITING: 0
SHORTNESS OF BREATH: 0
WHEEZING: 0
TROUBLE SWALLOWING: 0
NAUSEA: 0
COUGH: 0
VOICE CHANGE: 0

## 2019-03-31 ASSESSMENT — ENCOUNTER SYMPTOMS
ABDOMINAL PAIN: 0
BACK PAIN: 0

## 2019-04-02 ENCOUNTER — HOSPITAL ENCOUNTER (OUTPATIENT)
Age: 29
Setting detail: SPECIMEN
Discharge: HOME | End: 2019-04-02

## 2019-04-03 LAB
CULTURE: NORMAL
Lab: NORMAL
SPECIMEN DESCRIPTION: NORMAL

## 2019-04-10 ENCOUNTER — HOSPITAL ENCOUNTER (OUTPATIENT)
Age: 29
Setting detail: OUTPATIENT SURGERY
Discharge: HOME | End: 2019-04-10
Attending: SURGERY | Admitting: SURGERY

## 2019-04-10 ENCOUNTER — ANESTHESIA EVENT (OUTPATIENT)
Dept: OPERATING ROOM | Age: 29
End: 2019-04-10

## 2019-04-10 ENCOUNTER — ANESTHESIA (OUTPATIENT)
Dept: OPERATING ROOM | Age: 29
End: 2019-04-10

## 2019-04-10 VITALS
OXYGEN SATURATION: 100 % | DIASTOLIC BLOOD PRESSURE: 75 MMHG | SYSTOLIC BLOOD PRESSURE: 129 MMHG | TEMPERATURE: 97.8 F | RESPIRATION RATE: 1 BRPM

## 2019-04-10 VITALS
TEMPERATURE: 97.1 F | HEIGHT: 59 IN | DIASTOLIC BLOOD PRESSURE: 61 MMHG | BODY MASS INDEX: 37.86 KG/M2 | OXYGEN SATURATION: 98 % | HEART RATE: 77 BPM | WEIGHT: 187.8 LBS | SYSTOLIC BLOOD PRESSURE: 124 MMHG | RESPIRATION RATE: 18 BRPM

## 2019-04-10 DIAGNOSIS — K80.44 CALCULUS OF BILE DUCT WITH CHRONIC CHOLECYSTITIS WITHOUT OBSTRUCTION: Primary | ICD-10-CM

## 2019-04-10 PROBLEM — O09.90 SUPERVISION OF HIGH-RISK PREGNANCY: Status: RESOLVED | Noted: 2018-12-18 | Resolved: 2019-04-10

## 2019-04-10 LAB
ALBUMIN SERPL-MCNC: 4.5 G/DL (ref 3.5–5.1)
ALP BLD-CCNC: 100 U/L (ref 38–126)
ALT SERPL-CCNC: 149 U/L (ref 11–66)
AST SERPL-CCNC: 80 U/L (ref 5–40)
BILIRUB SERPL-MCNC: 0.4 MG/DL (ref 0.3–1.2)
BILIRUBIN DIRECT: < 0.2 MG/DL (ref 0–0.3)
PREGNANCY, URINE: NEGATIVE
TOTAL PROTEIN: 7.9 G/DL (ref 6.1–8)

## 2019-04-10 PROCEDURE — 81025 URINE PREGNANCY TEST: CPT

## 2019-04-10 PROCEDURE — 7100000011 HC PHASE II RECOVERY - ADDTL 15 MIN: Performed by: SURGERY

## 2019-04-10 PROCEDURE — 88304 TISSUE EXAM BY PATHOLOGIST: CPT

## 2019-04-10 PROCEDURE — 7100000000 HC PACU RECOVERY - FIRST 15 MIN: Performed by: SURGERY

## 2019-04-10 PROCEDURE — 3600000003 HC SURGERY LEVEL 3 BASE: Performed by: SURGERY

## 2019-04-10 PROCEDURE — 6360000002 HC RX W HCPCS: Performed by: SURGERY

## 2019-04-10 PROCEDURE — 36415 COLL VENOUS BLD VENIPUNCTURE: CPT

## 2019-04-10 PROCEDURE — 3600000013 HC SURGERY LEVEL 3 ADDTL 15MIN: Performed by: SURGERY

## 2019-04-10 PROCEDURE — 2500000003 HC RX 250 WO HCPCS: Performed by: SURGERY

## 2019-04-10 PROCEDURE — 7100000001 HC PACU RECOVERY - ADDTL 15 MIN: Performed by: SURGERY

## 2019-04-10 PROCEDURE — 80076 HEPATIC FUNCTION PANEL: CPT

## 2019-04-10 PROCEDURE — 2709999900 HC NON-CHARGEABLE SUPPLY: Performed by: SURGERY

## 2019-04-10 PROCEDURE — 3700000001 HC ADD 15 MINUTES (ANESTHESIA): Performed by: SURGERY

## 2019-04-10 PROCEDURE — 3700000000 HC ANESTHESIA ATTENDED CARE: Performed by: SURGERY

## 2019-04-10 PROCEDURE — 47562 LAPAROSCOPIC CHOLECYSTECTOMY: CPT | Performed by: SURGERY

## 2019-04-10 PROCEDURE — 6360000002 HC RX W HCPCS: Performed by: NURSE ANESTHETIST, CERTIFIED REGISTERED

## 2019-04-10 PROCEDURE — 2500000003 HC RX 250 WO HCPCS: Performed by: NURSE ANESTHETIST, CERTIFIED REGISTERED

## 2019-04-10 PROCEDURE — 6370000000 HC RX 637 (ALT 250 FOR IP): Performed by: SURGERY

## 2019-04-10 PROCEDURE — 6360000002 HC RX W HCPCS: Performed by: ANESTHESIOLOGY

## 2019-04-10 PROCEDURE — 2580000003 HC RX 258: Performed by: SURGERY

## 2019-04-10 PROCEDURE — 7100000010 HC PHASE II RECOVERY - FIRST 15 MIN: Performed by: SURGERY

## 2019-04-10 RX ORDER — MORPHINE SULFATE 2 MG/ML
2 INJECTION, SOLUTION INTRAMUSCULAR; INTRAVENOUS EVERY 5 MIN PRN
Status: DISCONTINUED | OUTPATIENT
Start: 2019-04-10 | End: 2019-04-10 | Stop reason: HOSPADM

## 2019-04-10 RX ORDER — HYDROCODONE BITARTRATE AND ACETAMINOPHEN 5; 325 MG/1; MG/1
2 TABLET ORAL EVERY 4 HOURS PRN
Status: DISCONTINUED | OUTPATIENT
Start: 2019-04-10 | End: 2019-04-10 | Stop reason: HOSPADM

## 2019-04-10 RX ORDER — DEXAMETHASONE SODIUM PHOSPHATE 4 MG/ML
INJECTION, SOLUTION INTRA-ARTICULAR; INTRALESIONAL; INTRAMUSCULAR; INTRAVENOUS; SOFT TISSUE PRN
Status: DISCONTINUED | OUTPATIENT
Start: 2019-04-10 | End: 2019-04-10 | Stop reason: SDUPTHER

## 2019-04-10 RX ORDER — HYDROCODONE BITARTRATE AND ACETAMINOPHEN 5; 325 MG/1; MG/1
1 TABLET ORAL EVERY 4 HOURS PRN
Status: DISCONTINUED | OUTPATIENT
Start: 2019-04-10 | End: 2019-04-10 | Stop reason: HOSPADM

## 2019-04-10 RX ORDER — LABETALOL HYDROCHLORIDE 5 MG/ML
5 INJECTION, SOLUTION INTRAVENOUS EVERY 5 MIN PRN
Status: DISCONTINUED | OUTPATIENT
Start: 2019-04-10 | End: 2019-04-10 | Stop reason: HOSPADM

## 2019-04-10 RX ORDER — ONDANSETRON 2 MG/ML
4 INJECTION INTRAMUSCULAR; INTRAVENOUS EVERY 6 HOURS PRN
Status: DISCONTINUED | OUTPATIENT
Start: 2019-04-10 | End: 2019-04-10 | Stop reason: HOSPADM

## 2019-04-10 RX ORDER — SODIUM CHLORIDE 9 MG/ML
INJECTION, SOLUTION INTRAVENOUS CONTINUOUS
Status: DISCONTINUED | OUTPATIENT
Start: 2019-04-10 | End: 2019-04-10 | Stop reason: HOSPADM

## 2019-04-10 RX ORDER — DIPHENHYDRAMINE HYDROCHLORIDE 50 MG/ML
12.5 INJECTION INTRAMUSCULAR; INTRAVENOUS
Status: DISCONTINUED | OUTPATIENT
Start: 2019-04-10 | End: 2019-04-10 | Stop reason: HOSPADM

## 2019-04-10 RX ORDER — FENTANYL CITRATE 50 UG/ML
INJECTION, SOLUTION INTRAMUSCULAR; INTRAVENOUS PRN
Status: DISCONTINUED | OUTPATIENT
Start: 2019-04-10 | End: 2019-04-10 | Stop reason: SDUPTHER

## 2019-04-10 RX ORDER — CEPHALEXIN 500 MG/1
500 CAPSULE ORAL 3 TIMES DAILY
COMMUNITY
End: 2019-04-25 | Stop reason: ALTCHOICE

## 2019-04-10 RX ORDER — SODIUM CHLORIDE 0.9 % (FLUSH) 0.9 %
10 SYRINGE (ML) INJECTION PRN
Status: DISCONTINUED | OUTPATIENT
Start: 2019-04-10 | End: 2019-04-10 | Stop reason: HOSPADM

## 2019-04-10 RX ORDER — ONDANSETRON 2 MG/ML
INJECTION INTRAMUSCULAR; INTRAVENOUS PRN
Status: DISCONTINUED | OUTPATIENT
Start: 2019-04-10 | End: 2019-04-10 | Stop reason: SDUPTHER

## 2019-04-10 RX ORDER — MEPERIDINE HYDROCHLORIDE 25 MG/ML
12.5 INJECTION INTRAMUSCULAR; INTRAVENOUS; SUBCUTANEOUS EVERY 5 MIN PRN
Status: DISCONTINUED | OUTPATIENT
Start: 2019-04-10 | End: 2019-04-10 | Stop reason: HOSPADM

## 2019-04-10 RX ORDER — BUPIVACAINE HYDROCHLORIDE 5 MG/ML
INJECTION, SOLUTION PERINEURAL PRN
Status: DISCONTINUED | OUTPATIENT
Start: 2019-04-10 | End: 2019-04-10 | Stop reason: ALTCHOICE

## 2019-04-10 RX ORDER — ROCURONIUM BROMIDE 10 MG/ML
INJECTION, SOLUTION INTRAVENOUS PRN
Status: DISCONTINUED | OUTPATIENT
Start: 2019-04-10 | End: 2019-04-10 | Stop reason: SDUPTHER

## 2019-04-10 RX ORDER — HYDRALAZINE HYDROCHLORIDE 20 MG/ML
5 INJECTION INTRAMUSCULAR; INTRAVENOUS EVERY 10 MIN PRN
Status: DISCONTINUED | OUTPATIENT
Start: 2019-04-10 | End: 2019-04-10 | Stop reason: HOSPADM

## 2019-04-10 RX ORDER — PROPOFOL 10 MG/ML
INJECTION, EMULSION INTRAVENOUS PRN
Status: DISCONTINUED | OUTPATIENT
Start: 2019-04-10 | End: 2019-04-10 | Stop reason: SDUPTHER

## 2019-04-10 RX ORDER — SODIUM CHLORIDE 0.9 % (FLUSH) 0.9 %
10 SYRINGE (ML) INJECTION EVERY 12 HOURS SCHEDULED
Status: DISCONTINUED | OUTPATIENT
Start: 2019-04-10 | End: 2019-04-10 | Stop reason: HOSPADM

## 2019-04-10 RX ORDER — HYDROCODONE BITARTRATE AND ACETAMINOPHEN 5; 325 MG/1; MG/1
1 TABLET ORAL EVERY 6 HOURS PRN
Qty: 28 TABLET | Refills: 0 | Status: SHIPPED | OUTPATIENT
Start: 2019-04-10 | End: 2019-04-17

## 2019-04-10 RX ORDER — KETOROLAC TROMETHAMINE 30 MG/ML
INJECTION, SOLUTION INTRAMUSCULAR; INTRAVENOUS PRN
Status: DISCONTINUED | OUTPATIENT
Start: 2019-04-10 | End: 2019-04-10 | Stop reason: SDUPTHER

## 2019-04-10 RX ORDER — ONDANSETRON 2 MG/ML
4 INJECTION INTRAMUSCULAR; INTRAVENOUS
Status: DISCONTINUED | OUTPATIENT
Start: 2019-04-10 | End: 2019-04-10 | Stop reason: HOSPADM

## 2019-04-10 RX ORDER — FENTANYL CITRATE 50 UG/ML
50 INJECTION, SOLUTION INTRAMUSCULAR; INTRAVENOUS EVERY 5 MIN PRN
Status: DISCONTINUED | OUTPATIENT
Start: 2019-04-10 | End: 2019-04-10 | Stop reason: HOSPADM

## 2019-04-10 RX ORDER — NEOSTIGMINE METHYLSULFATE 5 MG/5 ML
SYRINGE (ML) INTRAVENOUS PRN
Status: DISCONTINUED | OUTPATIENT
Start: 2019-04-10 | End: 2019-04-10 | Stop reason: SDUPTHER

## 2019-04-10 RX ADMIN — HYDROCODONE BITARTRATE AND ACETAMINOPHEN 1 TABLET: 5; 325 TABLET ORAL at 16:20

## 2019-04-10 RX ADMIN — FENTANYL CITRATE 100 MCG: 50 INJECTION INTRAMUSCULAR; INTRAVENOUS at 13:18

## 2019-04-10 RX ADMIN — KETOROLAC TROMETHAMINE 30 MG: 30 INJECTION, SOLUTION INTRAMUSCULAR; INTRAVENOUS at 13:49

## 2019-04-10 RX ADMIN — DEXAMETHASONE SODIUM PHOSPHATE 8 MG: 4 INJECTION, SOLUTION INTRAMUSCULAR; INTRAVENOUS at 13:38

## 2019-04-10 RX ADMIN — Medication 5 MG: at 13:53

## 2019-04-10 RX ADMIN — CEFOXITIN SODIUM 2 G: 1 POWDER, FOR SOLUTION INTRAVENOUS at 13:23

## 2019-04-10 RX ADMIN — PROPOFOL 200 MG: 10 INJECTION, EMULSION INTRAVENOUS at 13:18

## 2019-04-10 RX ADMIN — FENTANYL CITRATE 50 MCG: 50 INJECTION, SOLUTION INTRAMUSCULAR; INTRAVENOUS at 14:49

## 2019-04-10 RX ADMIN — FENTANYL CITRATE 100 MCG: 50 INJECTION INTRAMUSCULAR; INTRAVENOUS at 13:41

## 2019-04-10 RX ADMIN — FENTANYL CITRATE 50 MCG: 50 INJECTION, SOLUTION INTRAMUSCULAR; INTRAVENOUS at 14:40

## 2019-04-10 RX ADMIN — ONDANSETRON HYDROCHLORIDE 4 MG: 4 INJECTION, SOLUTION INTRAMUSCULAR; INTRAVENOUS at 13:38

## 2019-04-10 RX ADMIN — SODIUM CHLORIDE: 9 INJECTION, SOLUTION INTRAVENOUS at 13:14

## 2019-04-10 RX ADMIN — ROCURONIUM BROMIDE 30 MG: 10 INJECTION INTRAVENOUS at 13:18

## 2019-04-10 ASSESSMENT — PULMONARY FUNCTION TESTS
PIF_VALUE: 24
PIF_VALUE: 24
PIF_VALUE: 28
PIF_VALUE: 22
PIF_VALUE: 0
PIF_VALUE: 1
PIF_VALUE: 5
PIF_VALUE: 27
PIF_VALUE: 32
PIF_VALUE: 25
PIF_VALUE: 29
PIF_VALUE: 25
PIF_VALUE: 23
PIF_VALUE: 2
PIF_VALUE: 8
PIF_VALUE: 22
PIF_VALUE: 21
PIF_VALUE: 1
PIF_VALUE: 22
PIF_VALUE: 27
PIF_VALUE: 25
PIF_VALUE: 26
PIF_VALUE: 0
PIF_VALUE: 22
PIF_VALUE: 0
PIF_VALUE: 25
PIF_VALUE: 26
PIF_VALUE: 32
PIF_VALUE: 25
PIF_VALUE: 31
PIF_VALUE: 2
PIF_VALUE: 25
PIF_VALUE: 2
PIF_VALUE: 26
PIF_VALUE: 25
PIF_VALUE: 21
PIF_VALUE: 27
PIF_VALUE: 23
PIF_VALUE: 22
PIF_VALUE: 22
PIF_VALUE: 1
PIF_VALUE: 0
PIF_VALUE: 24
PIF_VALUE: 24
PIF_VALUE: 0
PIF_VALUE: 30
PIF_VALUE: 24
PIF_VALUE: 31

## 2019-04-10 ASSESSMENT — PAIN DESCRIPTION - PAIN TYPE: TYPE: SURGICAL PAIN

## 2019-04-10 ASSESSMENT — PAIN SCALES - GENERAL
PAINLEVEL_OUTOF10: 5
PAINLEVEL_OUTOF10: 2
PAINLEVEL_OUTOF10: 5
PAINLEVEL_OUTOF10: 5

## 2019-04-10 NOTE — PROGRESS NOTES
1506 Family to side. 1511 Water given, snacks refused. 1535 No urge to void. Needs denied. States pain 3/10, increases with movement only. Educated patient to move, use arms and legs, not stomach. Instructed to splint incision with movement, position changes, etc. Does well with pillow. Understanding voiced by patient and . 1550 Discharge instructions given, understanding voiced, questions answered. Both [place] [facility] [place] instructions given. Prescription given. 26 Dressed with families help per patient request. To bathroom to void. Patient without urge to void, states, \"I will try before I go home. \" Gait slow and steady. 1615 No void noted. To wheelchair, pain with movement/walking. Patient states she wishes to stay in wheelchair rather than getting back in bed, states \"it's more comfortable. \" Fresh ice pack given. Applesauce given. Complaining of the increased pain with movements and walking, 5/10. Pain medication offered and accepted. 1620 Medicated for pain. Refuses recliner chair offered. States \"I'll stay here in the wheelchair. I want to leave soon. \"  [phone] Denies needs. States pain is 3/10.  Patient brighter, smiling states \"I'm ready to go\"

## 2019-04-10 NOTE — H&P
[facility]  History and Physical Update    Pt Name: Flo Yoon  MRN: [id number]  YOB: 1990  Date of evaluation: 4/10/2019    [x] I have examined the patient and reviewed the H&P/Consult and there are no changes to the patient or plans. [] I have examined the patient and reviewed the H&P/Consult and have noted the following changes:        Trupti Lopez MD  Electronically signed 4/10/2019 at 11:33 AM    Trupti Lopez MD   General Surgery  Follow-up Patient Evaluation in Office  Pt Name: Flo Yoon  Date of Birth 1990   Today's Date: 3/28/2019  Medical Record Number: [id number]  Referring Provider: No ref. provider found  [address] [place] [address]  Chief Complaint:       Chief Complaint   Patient presents with    Pre-op Exam       pt no showed to lap penelope 3/7         ASSESSMENT   1. Calculus of gallbladder without cholecystitis without obstruction    2. Recurrent biliary colic       PLANS   1. Persistent, recurring biliary colic postpartum  2. Recommend cholecystectomy patient no showed for surgery previously. 3.  Schedule patient for laparoscopic cholecystectomy. Risks of procedure were discussed with the patient. Risks include but not limited to: Bleeding, infection, retained stones, postoperative diarrhea, bile duct leak, bile duct injury as well as conversion to open procedure. Patient expressed understanding which she was surgery. 4.  Gen. Anesthesia  5. Preoperative testing per anesthesia guidelines. Hemoglobin and hematocrit and hepatic function tests ordered.            SUBJECTIVE   History of present illness:  Meena Duran is a 25y.o. year old female who is presenting today in the office for evaluation of cholelithiasis. The patient is now postpartum delivering a healthy baby girl in mid December.   She continues to have intermittent episodes of what sounds like biliary colic with some epigastric pain and radiation to the right back.  Symptoms are exacerbated by eating fatty foods. Gallbladder ultrasound performed several months ago revealed cholelithiasis but no signs of biliary duct dilation or cholecystitis. She denies any dark urine or lighter colored bowel movements. She has no significant reflux symptoms. She denies any prior history of hepatitis or jaundice.      Interval history: Patient no showed for previous surgery. They didn't show due to insurance issues but they also did not call the office. She describes persistent biliary colic. She wishes to reschedule surgery. She is currently breast-feeding. Pain when it occurs describes as pressure type sharp sensation in the epigastrium without real radiation. She denies any prior known history of peptic ulcer disease, pancreatitis or hepatitis. She states she currently is having pain on a daily basis. Past Medical History  Past Medical History        Past Medical History:   Diagnosis Date    Cholelithiases            Past Surgical History  Past Surgical History   History reviewed. No pertinent surgical history. Medications  Current Facility-Administered Medications          Current Outpatient Medications   Medication Sig Dispense Refill    ibuprofen (ADVIL;MOTRIN) 200 MG tablet Take 200 mg by mouth every 6 hours as needed for Pain        acetaminophen (TYLENOL) 325 MG tablet Take 650 mg by mouth every 6 hours as needed for Pain          No current facility-administered medications for this visit.          Allergies  No Known Allergies    Family History  Family History   History reviewed. No pertinent family history.        SocialHistory  Social History               Socioeconomic History    Marital status:        Spouse name: Not on file    Number of children: 3    Years of education: Not on file    Highest education level: Not on file   Occupational History    Not on file   Social Needs    Financial resource strain: Not on file    Food insecurity:

## 2019-04-10 NOTE — OP NOTE
[address] [place] [address]  Operative Report    PATIENT NAME: Rukhsana Zaidi RECORD NO. [id number]  SURGEON: Glenys Hinojosa MD  Primary Care Physician: Hermelinda Peabody, BERTO - NATHANAEL        PROCEDURE PERFORMED:  4/10/2019     PREOPERATIVE DIAGNOSIS:       1. Symptomatic cholelithiasis. POSTOPERATIVE DIAGNOSIS:       1. Symptomatic cholelithiasis. PROCEDURE PERFORMED:  Laparoscopic cholecystectomy. Dinah Delgado MD     ANESTHESIA:  General with local.     ESTIMATED BLOOD LOSS:  10 ml     SPECIMEN:  Gallbladder sent to pathology for analysis. COMPLICATIONS:  None immediately appreciated. DISCUSSION: Bryan Dubin is a 25y.o. year old female who presented to my office. Seen in evaluation at request of Dr. Zully Moreno regarding signs and  symptoms, gallbladder disease, radiographic findings of cholelithiasis. After history and physical examination was performed potential diagnostic and  therapeutic modalities discussed with the patient. Operative and nonoperative  management was discussed. Laparoscopic and open approaches reviewed. she  was given opportunity to ask questions. Once answered informed consent was  obtained. she was brought to operating room on 4/10/2019 for procedure. OPERATIVE FINDINGS:  At time of laparoscopy liver contour was within normal  limits. The gallbladder did exhibit cholelithiasis and was somewhat  intrahepatic in location. Common bile duct was well visualized. Ductal  structures well visualized. The remainder of abdominal viscera was  unremarkable. Gallbladder removed as described below. PROCEDURE:  The patient was brought to the operating room and placed in  supine position. Placed under continuous cardiac telemetry, blood pressure,  pulse oximetry monitoring and placed under general anesthesia by anesthesia  department. The anterior abdominal wall was prepped and draped in sterile fashion.   1 cm  periumbilical incision made with #15 scalpel blade. Palmer cannula was   inserted into the abdomen under direct visualization after placement of two 0- vicryl stay sutures. Pneumoperitoneum was  created to total of 15 mm of Mercury. Visual inspection of the abdomen  carried out. Please see operative findings above for specifics. An  additional 10 mm port was placed to right lateral falciform ligament. Two 5  mm ports placed to right lateral abdominal wall under direct visualization  with no injury to underlying viscera. The gallbladder then grasped,  retracted up towards right shoulder. Gentle downward and lateral traction were applied to the infundibulum. Blunt dissection through the triangle  of Calot allowed adequate visualization of cystic duct and cystic artery. The common bile duct was seen and free of harm. The cystic duct and cystic  artery were clipped using Endo clips, incising Endo ra. The gallbladder  then retracted laterally and dissected free off the gallbladder fossa using  combination of blunt dissection and electrocautery. The gallbladder was then  placed in  an Endocatch bag and removed out the umbilical  port site. The  fossa was reinspected. Adequate hemostasis appreciated. Clips noted to be  in proper position. Common bile duct free of harm. No evidence of bile leak  was evident. The right upper quadrant was copiously irrigated. Excess  irrigation and fluid removed via suction. All ports and instruments removed  from the patient's abdomen. Pneumoperitoneum  was reduced. Fascial defects  approximated using 0 Vicryl suture. Skin edges was infiltrated with local  aesthetic. Skin closed using 4-0 Vicryl suture in combination of single  interrupted running subcuticular fashion. The wound was then cleansed  prepared with Mastisol, Steri-Strips, sterile dressings were applied. Patient brought out of anesthesia, transferred to PACU in stable and  satisfactory condition.  No immediate complications evident. All sponge,  instrument and needle counts were correct at the completion of procedure. Postop findings were discussed with the patient's family. she was given  discharge instructions, prescriptions for analgesics and antiemetics. she  will follow up in my office in a 2-3week period of time for reevaluation.               Tawanda Seymour MD  Electronically signed 4/10/2019 at 1:54 PM

## 2019-04-10 NOTE — ANESTHESIA POSTPROCEDURE EVALUATION
Department of Anesthesiology  Postprocedure Note    Patient: Julianna Alicea  MRN: [id number]  YOB: 1990  Date of evaluation: 4/10/2019  Time:  2:29 PM     Procedure Summary     Date:  04/10/19 Room / Location:  [facility] 04 / [address]    Anesthesia Start:  9661 Anesthesia Stop:  0101    Procedure:  LAP CHOLECYSTECECTOMY (N/A Abdomen) Diagnosis:  (CALCULUS OF GALLBLADDER)    Surgeon:  Kate Wolff MD Responsible Provider:  Rachael Villegas MD    Anesthesia Type:  general ASA Status:  2          Anesthesia Type: general    Natalie Phase I:      Natalie Phase II:      Last vitals: Reviewed and per EMR flowsheets.        Anesthesia Post Evaluation    Patient location during evaluation: PACU  Patient participation: complete - patient participated  Level of consciousness: awake  Pain score: 0  Airway patency: patent  Nausea & Vomiting: no nausea and no vomiting  Complications: no  Cardiovascular status: hemodynamically stable  Respiratory status: acceptable  Hydration status: euvolemic

## 2019-04-10 NOTE — ANESTHESIA PRE PROCEDURE
Department of Anesthesiology  Preprocedure Note       Name:  Jada Hernandez   Age:  25 y.o.  :  1990                                          MRN:  [id number]         Date:  4/10/2019      Surgeon: Js Schwartz):  Gio Weaver MD    Procedure: LAP TONYA POSS OPEN (N/A Abdomen)    Medications prior to admission:   Prior to Admission medications    Medication Sig Start Date End Date Taking? Authorizing Provider   ibuprofen (ADVIL;MOTRIN) 200 MG tablet Take 200 mg by mouth every 6 hours as needed for Pain    Historical Provider, MD   acetaminophen (TYLENOL) 325 MG tablet Take 650 mg by mouth every 6 hours as needed for Pain    Historical Provider, MD       Current medications:    Current Facility-Administered Medications   Medication Dose Route Frequency Provider Last Rate Last Dose    0.9 % sodium chloride infusion   Intravenous Continuous Gio Weaver MD        sodium chloride flush 0.9 % injection 10 mL  10 mL Intravenous 2 times per day Gio Weaver MD        sodium chloride flush 0.9 % injection 10 mL  10 mL Intravenous PRN Gio Weaver MD        cefOXitin (MEFOXIN) 2 g in dextrose 5% 50 mL (mini-bag)  2 g Intravenous On Call to [address] [place] [address] [place], [place]           Allergies:  No Known Allergies    Problem List:    Patient Active Problem List   Diagnosis Code    Supervision of high-risk pregnancy O09.90       Past Medical History:        Diagnosis Date    Cholelithiases        Past Surgical History:  No past surgical history on file. Social History:    Social History     Tobacco Use    Smoking status: Former Smoker     Packs/day: 0.50     Types: Cigarettes    Smokeless tobacco: Never Used   Substance Use Topics    Alcohol use: No                                Counseling given: Not Answered      Vital Signs (Current): There were no vitals filed for this visit.                                            BP Readings from Last 3 Encounters:   19 116/76   19 120/70   18 (!) 106/56 NPO Status:                                                                                 BMI:   Wt Readings from Last 3 Encounters:   03/28/19 191 lb (86.6 kg)   02/04/19 194 lb (88 kg)   12/18/18 209 lb (94.8 kg)     There is no height or weight on file to calculate BMI.    CBC:   Lab Results   Component Value Date    WBC 11.2 12/18/2018    RBC 4.42 12/18/2018    RBC 4.93 06/27/2018    HGB 12.4 02/18/2019    HCT 38.6 02/18/2019    MCV 81.0 12/18/2018    RDW 15.2 06/27/2018     12/18/2018       CMP:   Lab Results   Component Value Date     05/14/2018    K 4.1 05/14/2018     05/14/2018    CO2 21 05/14/2018    BUN 8 05/14/2018    CREATININE 0.5 05/14/2018    LABGLOM >90 05/14/2018    GLUCOSE 90 05/14/2018    PROT 7.9 02/18/2019    CALCIUM 9.0 05/14/2018    BILITOT 0.5 02/18/2019    ALKPHOS 113 02/18/2019     02/18/2019     02/18/2019       POC Tests: No results for input(s): POCGLU, POCNA, POCK, POCCL, POCBUN, POCHEMO, POCHCT in the last 72 hours. Coags: No results found for: PROTIME, INR, APTT    HCG (If Applicable):   Lab Results   Component Value Date    PREGTESTUR negative 04/10/2019    PREGSERUM NEGATIVE 10/30/2011        ABGs: No results found for: PHART, PO2ART, EGG3KIA, PZH7IHE, BEART, V3UUXOIL     Type & Screen (If Applicable):  Lab Results   Component Value Date    LABABO O 06/27/2018    [facility] POS 12/18/2018       Anesthesia Evaluation    Airway: Mallampati: II       Mouth opening: > = 3 FB Dental:          Pulmonary:       (-) COPD                           Cardiovascular:        (-) hypertension                Neuro/Psych:               GI/Hepatic/Renal:             Endo/Other:                     Abdominal:   (+) obese,         Vascular:                                        Anesthesia Plan      general     ASA 2       Induction: intravenous. Anesthetic plan and risks discussed with patient. Plan discussed with CRNA.                   Isabelle Dalton MD 4/10/2019

## 2019-04-11 ENCOUNTER — TELEPHONE (OUTPATIENT)
Dept: SURGERY | Age: 29
End: 2019-04-11

## 2019-04-25 ENCOUNTER — OFFICE VISIT (OUTPATIENT)
Dept: SURGERY | Age: 29
End: 2019-04-25

## 2019-04-25 VITALS
OXYGEN SATURATION: 98 % | BODY MASS INDEX: 39.28 KG/M2 | DIASTOLIC BLOOD PRESSURE: 60 MMHG | TEMPERATURE: 97.8 F | RESPIRATION RATE: 18 BRPM | HEART RATE: 72 BPM | SYSTOLIC BLOOD PRESSURE: 120 MMHG | HEIGHT: 58 IN | WEIGHT: 187.1 LBS

## 2019-04-25 DIAGNOSIS — K80.20 CALCULUS OF GALLBLADDER WITHOUT CHOLECYSTITIS WITHOUT OBSTRUCTION: Primary | ICD-10-CM

## 2019-04-25 DIAGNOSIS — Z98.890 POSTOPERATIVE STATE: ICD-10-CM

## 2019-04-25 PROCEDURE — 99024 POSTOP FOLLOW-UP VISIT: CPT | Performed by: SURGERY

## 2019-04-25 NOTE — LETTER
[address] [facility] Surgery  [place] [address] Norma Hernandez  Phone: [phone]  Fax: [phone]    Gustabo Neff MD        April 25, 2019     Patient: Issa Ramesh   YOB: 1990   Date of Visit: 4/25/2019       To Whom It May Concern: It is my medical opinion that Drema Cramp should remain off until May 13, 2019 for healing. If you have any questions or concerns, please don't hesitate to call.     Sincerely,        Gustabo Neff MD

## 2019-04-25 NOTE — PROGRESS NOTES
Gio Weaver MD   General Surgery  Postprocedure Evaluation in Office  Pt Name: Jada Hernandez  Date of Birth 1990   Today's Date: 4/25/2019  Medical Record Number: [id number]  Primary Care Provider: BERTO Vang CNP  Chief Complaint   Patient presents with   Sarah Esqueda Post-Op Check     s/p lap penelope 4/10     ASSESSMENT      1. Calculus of gallbladder without cholecystitis without obstruction    2. Postoperative state         PLAN     1. Patient doing well postoperatively. 2.  Pathology discussed with patient and significant other. 3.  Okay to return to work in 2-3 weeks without restriction. Tom Garcia is seen today for post-op follow-up. She is status post laparoscopic cholecystectomy. Only complaint is still some minor incisional pain. She has no nausea or vomiting. She denies any chronic diarrhea. Appetite is good. Medications    Current Outpatient Medications:     ibuprofen (ADVIL;MOTRIN) 200 MG tablet, Take 200 mg by mouth every 6 hours as needed for Pain, Disp: , Rfl:     Allergies  No Known Allergies    Review of Systems  History obtained from the patient. Constitutional: Denies any fever, chills, fatigue. Wound: Denies any rash, skin color changes or wound problems. Resp: Denies any cough, shortness of breath. CV: Denies any chest pain, orthopnea or syncope. GI: Positive for incisional discomfort only. Denies any nausea, vomiting, blood in the stool, constipation or diarrhea. : Denies any hematuria, hesitancy or dysuria. OBJECTIVE     VITALS: /60 (Site: Right Upper Arm, Position: Sitting, Cuff Size: Medium Adult)   Pulse 72   Temp 97.8 °F (36.6 °C) (Tympanic)   Resp 18   Ht 4' 10\" (1.473 m)   Wt 187 lb 1.6 oz (84.9 kg)   SpO2 98%   BMI 39.10 kg/m²     CONSTITUTIONAL: Alert and oriented times 3, no acute distress and cooperative to examination. SKIN: Skin color, texture, turgor normal. No rashes or lesions.   INCISION: wound margins intact and healing well. No signs of infection. No drainage. LUNGS: Lungs Clear  CARDIOVASCULAR: Normal Rate  ABDOMEN: Soft expected incisional tenderness  NEUROLOGIC: No sensory or motor nerve irritation      Performed by: [address] Pathology      KURTZ Leda Plasencia            [id number]  Assoc.                                              Page 1 of 1  [address]  [facility], [place] [address]                                                      PROC: 04/10/2019  [facility]                                    RECV: 2019  [address]                                    RPTD: 2019  [facility], [place] [address]                      MRN:  [id number]    LOC: [facility]                      ACCT: [id number]  SEX: F                      : 1990  AGE: 25 Y                         PATHOLOGY REPORT                      ATTN: MARIS HASKINS  [de-identified]                  REQ: MARIS HASKINS        Clinical Information: CALCULUS OF GALLBLADDER    FINAL DIAGNOSIS:  Gallbladder, resection:   Cholelithiasis and chronic cholecystitis. Specimen:  GALLBLADDER      Gross Examination:  The container is labeled Zaid Hoyos, gallbladder.  Received in  formalin is a gallbladder measuring 8 x 3 x 2.5 cm.  The serosal  surface is smooth and glistening.  There is a firm brown stone  occluding the cystic duct.  The lumen contains viscous green bile and  numerous large faceted yellow-brown stones.  The mucosa is bile stained  and shows no masses or lesions.  The wall thickness is approximately 3  mm.  Representative sections are submitted in one cassette. PCF:[id number]    Microscopic Examination:  Sections of the gallbladder demonstrate chronic cholecystitis. Cherene Selvin  is no evidence of malignancy. [id number]                                                    <Sign Out Dr. Leopoldo Jacobs M.D., F.C.A.P.       [facility]/ [address] [place] [address]  Printed on:  2019  Cisco Mclain

## 2019-05-08 ENCOUNTER — HOSPITAL ENCOUNTER (OUTPATIENT)
Age: 29
Setting detail: SPECIMEN
Discharge: HOME | End: 2019-05-08

## 2019-05-09 LAB
C. TRACHOMATIS DNA ,URINE: NEGATIVE
N. GONORRHOEAE DNA, URINE: NEGATIVE
SOURCE: NORMAL
SPECIMEN DESCRIPTION: NORMAL
TRICHOMONAS VAGINALI, MOLECULAR: NEGATIVE

## 2019-05-10 LAB
CULTURE: NORMAL
Lab: NORMAL
SPECIMEN DESCRIPTION: NORMAL

## 2019-05-24 ENCOUNTER — HOSPITAL ENCOUNTER (OUTPATIENT)
Age: 29
Setting detail: SPECIMEN
Discharge: HOME | End: 2019-05-24

## 2019-05-25 LAB
CULTURE: NORMAL
Lab: NORMAL
SPECIMEN DESCRIPTION: NORMAL

## 2019-06-14 ENCOUNTER — HOSPITAL ENCOUNTER (OUTPATIENT)
Age: 29
Setting detail: SPECIMEN
Discharge: HOME | End: 2019-06-14

## 2020-06-09 ENCOUNTER — HOSPITAL ENCOUNTER (OUTPATIENT)
Age: 30
Setting detail: SPECIMEN
Discharge: HOME | End: 2020-06-09

## 2020-06-10 LAB
ABO/RH: NORMAL
ABSOLUTE EOS #: 0.19 K/UL (ref 0–0.44)
ABSOLUTE IMMATURE GRANULOCYTE: 0.06 K/UL (ref 0–0.3)
ABSOLUTE LYMPH #: 1.88 K/UL (ref 1.1–3.7)
ABSOLUTE MONO #: 0.85 K/UL (ref 0.1–1.2)
ANTIBODY SCREEN: NEGATIVE
BASOPHILS # BLD: 0 % (ref 0–2)
BASOPHILS ABSOLUTE: 0.03 K/UL (ref 0–0.2)
CULTURE: ABNORMAL
DIFFERENTIAL TYPE: ABNORMAL
EOSINOPHILS RELATIVE PERCENT: 2 % (ref 1–4)
HCT VFR BLD CALC: 34.6 % (ref 36.3–47.1)
HEMOGLOBIN: 11 G/DL (ref 11.9–15.1)
HEPATITIS B SURFACE ANTIGEN: NONREACTIVE
HIV AG/AB: NONREACTIVE
IMMATURE GRANULOCYTES: 1 %
LYMPHOCYTES # BLD: 19 % (ref 24–43)
Lab: ABNORMAL
MCH RBC QN AUTO: 25.3 PG (ref 25.2–33.5)
MCHC RBC AUTO-ENTMCNC: 31.8 G/DL (ref 28.4–34.8)
MCV RBC AUTO: 79.5 FL (ref 82.6–102.9)
MONOCYTES # BLD: 9 % (ref 3–12)
NRBC AUTOMATED: 0 PER 100 WBC
PDW BLD-RTO: 18 % (ref 11.8–14.4)
PLATELET # BLD: 263 K/UL (ref 138–453)
PLATELET ESTIMATE: ABNORMAL
PMV BLD AUTO: 12.3 FL (ref 8.1–13.5)
RBC # BLD: 4.35 M/UL (ref 3.95–5.11)
RBC # BLD: ABNORMAL 10*6/UL
RUBV IGG SER QL: 105.6 IU/ML
SEG NEUTROPHILS: 69 % (ref 36–65)
SEGMENTED NEUTROPHILS ABSOLUTE COUNT: 6.75 K/UL (ref 1.5–8.1)
SPECIMEN DESCRIPTION: ABNORMAL
T. PALLIDUM, IGG: NONREACTIVE
VZV IGG SER QL IA: 1.49
WBC # BLD: 9.8 K/UL (ref 3.5–11.3)
WBC # BLD: ABNORMAL 10*3/UL

## 2020-06-16 LAB — VARICELLA-ZOSTER AB, IGM: 0.06 ISR

## 2020-11-08 ENCOUNTER — HOSPITAL ENCOUNTER (INPATIENT)
Age: 30
LOS: 1 days | Discharge: HOME | End: 2020-11-10
Attending: OBSTETRICS & GYNECOLOGY | Admitting: OBSTETRICS & GYNECOLOGY

## 2020-11-08 PROCEDURE — 85025 COMPLETE CBC W/AUTO DIFF WBC: CPT

## 2020-11-08 PROCEDURE — 2580000003 HC RX 258: Performed by: OBSTETRICS & GYNECOLOGY

## 2020-11-08 PROCEDURE — 36415 COLL VENOUS BLD VENIPUNCTURE: CPT

## 2020-11-08 PROCEDURE — 6360000002 HC RX W HCPCS

## 2020-11-08 PROCEDURE — 86592 SYPHILIS TEST NON-TREP QUAL: CPT

## 2020-11-08 PROCEDURE — 80048 BASIC METABOLIC PNL TOTAL CA: CPT

## 2020-11-08 RX ORDER — LIDOCAINE HYDROCHLORIDE 10 MG/ML
30 INJECTION, SOLUTION EPIDURAL; INFILTRATION; INTRACAUDAL; PERINEURAL PRN
Status: DISCONTINUED | OUTPATIENT
Start: 2020-11-08 | End: 2020-11-09 | Stop reason: HOSPADM

## 2020-11-08 RX ORDER — MORPHINE SULFATE 2 MG/ML
2 INJECTION, SOLUTION INTRAMUSCULAR; INTRAVENOUS
Status: DISCONTINUED | OUTPATIENT
Start: 2020-11-08 | End: 2020-11-09 | Stop reason: HOSPADM

## 2020-11-08 RX ORDER — DIPHENHYDRAMINE HYDROCHLORIDE 50 MG/ML
25 INJECTION INTRAMUSCULAR; INTRAVENOUS EVERY 4 HOURS PRN
Status: DISCONTINUED | OUTPATIENT
Start: 2020-11-08 | End: 2020-11-09 | Stop reason: HOSPADM

## 2020-11-08 RX ORDER — TERBUTALINE SULFATE 1 MG/ML
0.25 INJECTION, SOLUTION SUBCUTANEOUS
Status: ACTIVE | OUTPATIENT
Start: 2020-11-08 | End: 2020-11-08

## 2020-11-08 RX ORDER — MORPHINE SULFATE 4 MG/ML
4 INJECTION, SOLUTION INTRAMUSCULAR; INTRAVENOUS
Status: DISCONTINUED | OUTPATIENT
Start: 2020-11-08 | End: 2020-11-09 | Stop reason: HOSPADM

## 2020-11-08 RX ORDER — CARBOPROST TROMETHAMINE 250 UG/ML
250 INJECTION, SOLUTION INTRAMUSCULAR PRN
Status: CANCELLED | OUTPATIENT
Start: 2020-11-08

## 2020-11-08 RX ORDER — ACETAMINOPHEN 325 MG/1
650 TABLET ORAL EVERY 4 HOURS PRN
Status: DISCONTINUED | OUTPATIENT
Start: 2020-11-08 | End: 2020-11-09 | Stop reason: HOSPADM

## 2020-11-08 RX ORDER — MISOPROSTOL 200 UG/1
1000 TABLET ORAL PRN
Status: DISCONTINUED | OUTPATIENT
Start: 2020-11-08 | End: 2020-11-09 | Stop reason: HOSPADM

## 2020-11-08 RX ORDER — ONDANSETRON 2 MG/ML
8 INJECTION INTRAMUSCULAR; INTRAVENOUS EVERY 6 HOURS PRN
Status: DISCONTINUED | OUTPATIENT
Start: 2020-11-08 | End: 2020-11-09 | Stop reason: HOSPADM

## 2020-11-08 RX ORDER — METHYLERGONOVINE MALEATE 0.2 MG/ML
200 INJECTION INTRAVENOUS PRN
Status: DISCONTINUED | OUTPATIENT
Start: 2020-11-08 | End: 2020-11-09 | Stop reason: HOSPADM

## 2020-11-08 RX ORDER — SODIUM CHLORIDE, SODIUM LACTATE, POTASSIUM CHLORIDE, CALCIUM CHLORIDE 600; 310; 30; 20 MG/100ML; MG/100ML; MG/100ML; MG/100ML
INJECTION, SOLUTION INTRAVENOUS CONTINUOUS
Status: DISCONTINUED | OUTPATIENT
Start: 2020-11-09 | End: 2020-11-09

## 2020-11-08 RX ORDER — IBUPROFEN 800 MG/1
800 TABLET ORAL EVERY 8 HOURS PRN
Status: DISCONTINUED | OUTPATIENT
Start: 2020-11-08 | End: 2020-11-09 | Stop reason: HOSPADM

## 2020-11-08 RX ORDER — BUTORPHANOL TARTRATE 1 MG/ML
1 INJECTION, SOLUTION INTRAMUSCULAR; INTRAVENOUS
Status: DISCONTINUED | OUTPATIENT
Start: 2020-11-08 | End: 2020-11-09 | Stop reason: HOSPADM

## 2020-11-08 RX ADMIN — SODIUM CHLORIDE, POTASSIUM CHLORIDE, SODIUM LACTATE AND CALCIUM CHLORIDE: 600; 310; 30; 20 INJECTION, SOLUTION INTRAVENOUS at 23:45

## 2020-11-09 ENCOUNTER — ANESTHESIA EVENT (OUTPATIENT)
Dept: LABOR AND DELIVERY | Age: 30
End: 2020-11-09

## 2020-11-09 ENCOUNTER — ANESTHESIA (OUTPATIENT)
Dept: LABOR AND DELIVERY | Age: 30
End: 2020-11-09

## 2020-11-09 LAB
ABO: NORMAL
AMPHETAMINE+METHAMPHETAMINE URINE SCREEN: NEGATIVE
ANION GAP SERPL CALCULATED.3IONS-SCNC: 12 MEQ/L (ref 8–16)
ANTIBODY SCREEN: NORMAL
BARBITURATE QUANTITATIVE URINE: NEGATIVE
BASOPHILS # BLD: 0.3 %
BASOPHILS ABSOLUTE: 0 THOU/MM3 (ref 0–0.1)
BENZODIAZEPINE QUANTITATIVE URINE: NEGATIVE
BUN BLDV-MCNC: 14 MG/DL (ref 7–22)
CALCIUM SERPL-MCNC: 9.3 MG/DL (ref 8.5–10.5)
CANNABINOID QUANTITATIVE URINE: NEGATIVE
CHLORIDE BLD-SCNC: 103 MEQ/L (ref 98–111)
CO2: 20 MEQ/L (ref 23–33)
COCAINE METABOLITE QUANTITATIVE URINE: NEGATIVE
CREAT SERPL-MCNC: 0.5 MG/DL (ref 0.4–1.2)
EOSINOPHIL # BLD: 0.5 %
EOSINOPHILS ABSOLUTE: 0.1 THOU/MM3 (ref 0–0.4)
ERYTHROCYTE [DISTWIDTH] IN BLOOD BY AUTOMATED COUNT: 20.9 % (ref 11.5–14.5)
ERYTHROCYTE [DISTWIDTH] IN BLOOD BY AUTOMATED COUNT: 49 FL (ref 35–45)
GFR SERPL CREATININE-BSD FRML MDRD: > 90 ML/MIN/1.73M2
GLUCOSE BLD-MCNC: 82 MG/DL (ref 70–108)
HCT VFR BLD CALC: 34 % (ref 37–47)
HEMOGLOBIN: 10.1 GM/DL (ref 12–16)
IMMATURE GRANS (ABS): 0.08 THOU/MM3 (ref 0–0.07)
IMMATURE GRANULOCYTES: 0.7 %
LYMPHOCYTES # BLD: 13.7 %
LYMPHOCYTES ABSOLUTE: 1.6 THOU/MM3 (ref 1–4.8)
MCH RBC QN AUTO: 20.9 PG (ref 26–33)
MCHC RBC AUTO-ENTMCNC: 29.7 GM/DL (ref 32.2–35.5)
MCV RBC AUTO: 70.4 FL (ref 81–99)
MONOCYTES # BLD: 7.2 %
MONOCYTES ABSOLUTE: 0.8 THOU/MM3 (ref 0.4–1.3)
NUCLEATED RED BLOOD CELLS: 0 /100 WBC
OPIATES, URINE: NEGATIVE
OXYCODONE: NEGATIVE
PHENCYCLIDINE QUANTITATIVE URINE: NEGATIVE
PLATELET # BLD: 246 THOU/MM3 (ref 130–400)
POTASSIUM SERPL-SCNC: 4.8 MEQ/L (ref 3.5–5.2)
RBC # BLD: 4.83 MILL/MM3 (ref 4.2–5.4)
RH FACTOR: NORMAL
RPR: NONREACTIVE
SEG NEUTROPHILS: 77.6 %
SEGMENTED NEUTROPHILS ABSOLUTE COUNT: 8.9 THOU/MM3 (ref 1.8–7.7)
SODIUM BLD-SCNC: 135 MEQ/L (ref 135–145)
WBC # BLD: 11.5 THOU/MM3 (ref 4.8–10.8)

## 2020-11-09 PROCEDURE — 6370000000 HC RX 637 (ALT 250 FOR IP): Performed by: OBSTETRICS & GYNECOLOGY

## 2020-11-09 PROCEDURE — 1220000000 HC SEMI PRIVATE OB R&B

## 2020-11-09 PROCEDURE — 86901 BLOOD TYPING SEROLOGIC RH(D): CPT

## 2020-11-09 PROCEDURE — 7200000001 HC VAGINAL DELIVERY

## 2020-11-09 PROCEDURE — 80307 DRUG TEST PRSMV CHEM ANLYZR: CPT

## 2020-11-09 PROCEDURE — 2580000003 HC RX 258: Performed by: OBSTETRICS & GYNECOLOGY

## 2020-11-09 PROCEDURE — 2580000003 HC RX 258

## 2020-11-09 PROCEDURE — 6360000002 HC RX W HCPCS

## 2020-11-09 PROCEDURE — 6360000002 HC RX W HCPCS: Performed by: NURSE ANESTHETIST, CERTIFIED REGISTERED

## 2020-11-09 PROCEDURE — 3700000025 EPIDURAL BLOCK: Performed by: ANESTHESIOLOGY

## 2020-11-09 PROCEDURE — 86900 BLOOD TYPING SEROLOGIC ABO: CPT

## 2020-11-09 PROCEDURE — 86850 RBC ANTIBODY SCREEN: CPT

## 2020-11-09 PROCEDURE — 2500000003 HC RX 250 WO HCPCS: Performed by: NURSE ANESTHETIST, CERTIFIED REGISTERED

## 2020-11-09 RX ORDER — ONDANSETRON 2 MG/ML
4 INJECTION INTRAMUSCULAR; INTRAVENOUS EVERY 6 HOURS PRN
Status: DISCONTINUED | OUTPATIENT
Start: 2020-11-09 | End: 2020-11-09 | Stop reason: HOSPADM

## 2020-11-09 RX ORDER — DIPHENHYDRAMINE HCL 25 MG
50 TABLET ORAL EVERY 6 HOURS PRN
Status: DISCONTINUED | OUTPATIENT
Start: 2020-11-09 | End: 2020-11-10 | Stop reason: HOSPADM

## 2020-11-09 RX ORDER — METHYLERGONOVINE MALEATE 0.2 MG/ML
200 INJECTION INTRAVENOUS
Status: ACTIVE | OUTPATIENT
Start: 2020-11-09 | End: 2020-11-09

## 2020-11-09 RX ORDER — FERROUS SULFATE 325(65) MG
325 TABLET ORAL
Status: DISCONTINUED | OUTPATIENT
Start: 2020-11-09 | End: 2020-11-10 | Stop reason: HOSPADM

## 2020-11-09 RX ORDER — MORPHINE SULFATE 4 MG/ML
4 INJECTION, SOLUTION INTRAMUSCULAR; INTRAVENOUS
Status: DISCONTINUED | OUTPATIENT
Start: 2020-11-09 | End: 2020-11-10 | Stop reason: HOSPADM

## 2020-11-09 RX ORDER — SODIUM CHLORIDE 0.9 % (FLUSH) 0.9 %
10 SYRINGE (ML) INJECTION EVERY 12 HOURS SCHEDULED
Status: DISCONTINUED | OUTPATIENT
Start: 2020-11-09 | End: 2020-11-10 | Stop reason: HOSPADM

## 2020-11-09 RX ORDER — MISOPROSTOL 200 UG/1
800 TABLET ORAL
Status: ACTIVE | OUTPATIENT
Start: 2020-11-09 | End: 2020-11-09

## 2020-11-09 RX ORDER — HYDROCODONE BITARTRATE AND ACETAMINOPHEN 5; 325 MG/1; MG/1
1 TABLET ORAL EVERY 4 HOURS PRN
Status: DISCONTINUED | OUTPATIENT
Start: 2020-11-09 | End: 2020-11-10 | Stop reason: HOSPADM

## 2020-11-09 RX ORDER — ACETAMINOPHEN 325 MG/1
650 TABLET ORAL EVERY 4 HOURS PRN
Status: DISCONTINUED | OUTPATIENT
Start: 2020-11-09 | End: 2020-11-10 | Stop reason: HOSPADM

## 2020-11-09 RX ORDER — ONDANSETRON 2 MG/ML
4 INJECTION INTRAMUSCULAR; INTRAVENOUS EVERY 6 HOURS PRN
Status: DISCONTINUED | OUTPATIENT
Start: 2020-11-09 | End: 2020-11-10 | Stop reason: HOSPADM

## 2020-11-09 RX ORDER — ROPIVACAINE HYDROCHLORIDE 2 MG/ML
INJECTION, SOLUTION EPIDURAL; INFILTRATION; PERINEURAL PRN
Status: DISCONTINUED | OUTPATIENT
Start: 2020-11-09 | End: 2020-11-09 | Stop reason: SDUPTHER

## 2020-11-09 RX ORDER — ZOLPIDEM TARTRATE 10 MG/1
10 TABLET ORAL NIGHTLY PRN
Status: DISCONTINUED | OUTPATIENT
Start: 2020-11-09 | End: 2020-11-10 | Stop reason: HOSPADM

## 2020-11-09 RX ORDER — HYDROCORTISONE ACETATE 25 MG/1
25 SUPPOSITORY RECTAL 2 TIMES DAILY PRN
Status: DISCONTINUED | OUTPATIENT
Start: 2020-11-09 | End: 2020-11-10 | Stop reason: HOSPADM

## 2020-11-09 RX ORDER — MORPHINE SULFATE 2 MG/ML
2 INJECTION, SOLUTION INTRAMUSCULAR; INTRAVENOUS
Status: DISCONTINUED | OUTPATIENT
Start: 2020-11-09 | End: 2020-11-10 | Stop reason: HOSPADM

## 2020-11-09 RX ORDER — CARBOPROST TROMETHAMINE 250 UG/ML
250 INJECTION, SOLUTION INTRAMUSCULAR
Status: ACTIVE | OUTPATIENT
Start: 2020-11-09 | End: 2020-11-09

## 2020-11-09 RX ORDER — FENTANYL CITRATE 50 UG/ML
INJECTION, SOLUTION INTRAMUSCULAR; INTRAVENOUS PRN
Status: DISCONTINUED | OUTPATIENT
Start: 2020-11-09 | End: 2020-11-09 | Stop reason: SDUPTHER

## 2020-11-09 RX ORDER — MODIFIED LANOLIN
OINTMENT (GRAM) TOPICAL PRN
Status: DISCONTINUED | OUTPATIENT
Start: 2020-11-09 | End: 2020-11-10 | Stop reason: HOSPADM

## 2020-11-09 RX ORDER — IBUPROFEN 800 MG/1
800 TABLET ORAL 3 TIMES DAILY
Status: DISCONTINUED | OUTPATIENT
Start: 2020-11-09 | End: 2020-11-10 | Stop reason: HOSPADM

## 2020-11-09 RX ORDER — NALOXONE HYDROCHLORIDE 0.4 MG/ML
0.4 INJECTION, SOLUTION INTRAMUSCULAR; INTRAVENOUS; SUBCUTANEOUS PRN
Status: DISCONTINUED | OUTPATIENT
Start: 2020-11-09 | End: 2020-11-09 | Stop reason: HOSPADM

## 2020-11-09 RX ORDER — HYDROCODONE BITARTRATE AND ACETAMINOPHEN 5; 325 MG/1; MG/1
2 TABLET ORAL EVERY 4 HOURS PRN
Status: DISCONTINUED | OUTPATIENT
Start: 2020-11-09 | End: 2020-11-10 | Stop reason: HOSPADM

## 2020-11-09 RX ORDER — SODIUM CHLORIDE, SODIUM LACTATE, POTASSIUM CHLORIDE, CALCIUM CHLORIDE 600; 310; 30; 20 MG/100ML; MG/100ML; MG/100ML; MG/100ML
INJECTION, SOLUTION INTRAVENOUS CONTINUOUS
Status: DISCONTINUED | OUTPATIENT
Start: 2020-11-09 | End: 2020-11-10 | Stop reason: HOSPADM

## 2020-11-09 RX ORDER — DOCUSATE SODIUM 100 MG/1
100 CAPSULE, LIQUID FILLED ORAL 2 TIMES DAILY PRN
Status: DISCONTINUED | OUTPATIENT
Start: 2020-11-09 | End: 2020-11-10 | Stop reason: HOSPADM

## 2020-11-09 RX ORDER — ONDANSETRON 8 MG/1
8 TABLET, ORALLY DISINTEGRATING ORAL EVERY 8 HOURS PRN
Status: DISCONTINUED | OUTPATIENT
Start: 2020-11-09 | End: 2020-11-10 | Stop reason: HOSPADM

## 2020-11-09 RX ORDER — NALBUPHINE HCL 10 MG/ML
5 AMPUL (ML) INJECTION EVERY 4 HOURS PRN
Status: DISCONTINUED | OUTPATIENT
Start: 2020-11-09 | End: 2020-11-09 | Stop reason: HOSPADM

## 2020-11-09 RX ORDER — SODIUM CHLORIDE 0.9 % (FLUSH) 0.9 %
10 SYRINGE (ML) INJECTION PRN
Status: DISCONTINUED | OUTPATIENT
Start: 2020-11-09 | End: 2020-11-10 | Stop reason: HOSPADM

## 2020-11-09 RX ADMIN — ROPIVACAINE HYDROCHLORIDE 7 ML: 2 INJECTION, SOLUTION EPIDURAL; INFILTRATION at 01:52

## 2020-11-09 RX ADMIN — ACETAMINOPHEN 650 MG: 325 TABLET ORAL at 19:56

## 2020-11-09 RX ADMIN — DOCUSATE SODIUM 100 MG: 100 CAPSULE, LIQUID FILLED ORAL at 08:53

## 2020-11-09 RX ADMIN — ACETAMINOPHEN 650 MG: 325 TABLET ORAL at 08:53

## 2020-11-09 RX ADMIN — SODIUM CHLORIDE, POTASSIUM CHLORIDE, SODIUM LACTATE AND CALCIUM CHLORIDE: 600; 310; 30; 20 INJECTION, SOLUTION INTRAVENOUS at 00:34

## 2020-11-09 RX ADMIN — FENTANYL CITRATE 100 MCG: 50 INJECTION, SOLUTION INTRAMUSCULAR; INTRAVENOUS at 01:52

## 2020-11-09 RX ADMIN — DOCUSATE SODIUM 100 MG: 100 CAPSULE, LIQUID FILLED ORAL at 19:56

## 2020-11-09 RX ADMIN — Medication 167 ML: at 03:00

## 2020-11-09 RX ADMIN — Medication 16 ML/HR: at 01:52

## 2020-11-09 RX ADMIN — IBUPROFEN 800 MG: 800 TABLET, FILM COATED ORAL at 05:53

## 2020-11-09 RX ADMIN — IBUPROFEN 800 MG: 800 TABLET, FILM COATED ORAL at 14:10

## 2020-11-09 ASSESSMENT — PAIN SCALES - GENERAL
PAINLEVEL_OUTOF10: 9
PAINLEVEL_OUTOF10: 5
PAINLEVEL_OUTOF10: 7
PAINLEVEL_OUTOF10: 3
PAINLEVEL_OUTOF10: 3

## 2020-11-09 NOTE — ANESTHESIA POSTPROCEDURE EVALUATION
Department of Anesthesiology  Postprocedure Note    Patient: Coleman Trejo  MRN: [id number]  YOB: 1990  Date of evaluation: 11/9/2020  Time:  4:24 PM     Procedure Summary     Date:  11/09/20 Room / Location:      Anesthesia Start:  0513 Anesthesia Stop:  6503    Procedure:  Labor Analgesia Diagnosis:      Scheduled Providers:   Responsible Provider:  Trudi Tanner MD    Anesthesia Type:  epidural ASA Status:  3          Anesthesia Type: epidural    Natalie Phase I: Natalie Score: 9    Natalie Phase II: Natalie Score: 10    Last vitals: Reviewed and per EMR flowsheets.        Anesthesia Post Evaluation    Patient location during evaluation: bedside  Patient participation: complete - patient participated  Level of consciousness: awake  Pain score: 0  Airway patency: patent  Nausea & Vomiting: no nausea and no vomiting  Complications: no  Cardiovascular status: hemodynamically stable  Respiratory status: acceptable  Hydration status: euvolemic

## 2020-11-09 NOTE — FLOWSHEET NOTE
38w3d pt of Dr Marisela Buitrago arrived to unit with complaints of contractions. Pt speaks limited english, significant other speaks english well and they deny the need for an . Pt states she has been easton throughout the day, but the severity has increased in the last 2 hours. Pt denies any vaginal leaking or bleeding. Positive fetal movement noted. Pt oriented to room and instructed to change into gown. Pt verbalizes understanding.

## 2020-11-09 NOTE — PROGRESS NOTES
Department of Obstetrics and Gynecology  Spontaneous Vaginal Delivery Note      Pre-operative Diagnosis:  MATERNITY  Post-operative Diagnosis:  SAME    Information for the patient's :  Lorrie Gowers Baudilio Aden [[id number]]                    Infant Wt:   Information for the patient's :  [facility] [[id number][facility]             APGARS:     Information for the patient's :  Kosta Clarke Baudilio Aden [[id number]]             Anesthesia:  EPIDURAL    Delivery Summary:     , LACERATION REPAIRED     Estimated blood loss:    300 ML

## 2020-11-09 NOTE — FLOWSHEET NOTE
Dr Bruna Dorsey informed of  38w3d pt of Dr Antonio Chen arrived to unit with complaints of contractions. Pt states she has been easton throughout the day, but the severity has increased in the last 2 hours. Pt denies any vaginal leaking or bleeding. Positive fetal movement noted. SVE 7-8cm/80%/-2. FHR reactive. Dr Bruna Dorsey en route for delivery.

## 2020-11-09 NOTE — FLOWSHEET NOTE
Pt transferred to 5C19 via bed in stable condition while holding infant. Personal belongings brought with pt. Report given to [facility]. Care relinquished.

## 2020-11-09 NOTE — ANESTHESIA PROCEDURE NOTES
Epidural Block    Patient location during procedure: OB  Start time: 11/9/2020 12:49 AM  End time: 11/9/2020 1:52 AM  Reason for block: labor epidural  Staffing  Anesthesiologist: Gaurav Fajardo MD  Resident/CRNA: BERTO Johnston - CRNA  Performed: resident/CRNA   Preanesthetic Checklist  Completed: patient identified, site marked, surgical consent, pre-op evaluation, timeout performed, IV checked, risks and benefits discussed, monitors and equipment checked, anesthesia consent given, oxygen available and patient being monitored  Epidural  Patient position: sitting  Prep: ChloraPrep  Patient monitoring: continuous pulse ox and frequent blood pressure checks  Approach: midline  Location: lumbar (1-5)  Injection technique: ERICKSON saline  Provider prep: mask and sterile gloves  Needle  Needle type: Tuohy   Needle gauge: 18 G  Needle length: 3.5 in  Needle insertion depth: 8 cm  Catheter type: side hole  Catheter size: 19 G  Catheter at skin depth: 12 cm  Test dose: negative  Assessment  Hemodynamics: stable  Attempts: 3+  Additional Notes  Patient and  state that the last time patient had a spinal headache afterwards and was a difficult attempt. 3 attempts placed overall. First attempt patient was found to have clear ERICKSON with saline. While doing test dose patient was found to have numbness in one leg. Catheter was removed and attempted at a lower level. Patient spinal space was small. After much continued effort with patient and CRNA decision was to try a lower level. Patient was progressing. After 3 levels epidural space was found. Test dose was negative.

## 2020-11-09 NOTE — PLAN OF CARE
Problem: Anxiety:  Goal: Level of anxiety will decrease  Description: Level of anxiety will decrease  2020 by Victorino Royal RN  Note: Pt remains calm about the birthing experience,  at bedside, supportive. All questions/concerns addressed by RN.    2020 by Victorino Royal RN  Outcome: Ongoing     Problem: Breathing Pattern - Ineffective:  Goal: Able to breathe comfortably  Description: Able to breathe comfortably  2020 by Victorino Royal RN  Note: No signs of resp distress noted. Sp02 remains greater than 92% on room air. Respirations equal and unlabored. 2020 by Victorino Royal RN  Outcome: Ongoing     Problem: Fluid Volume - Imbalance:  Goal: Absence of intrapartum hemorrhage signs and symptoms  Description: Absence of intrapartum hemorrhage signs and symptoms  2020 by Victorino Royal RN  Note: No vaginal bleeding noted, will continue to monitor. 2020 by Victorino Royal RN  Outcome: Ongoing     Problem: Infection - Intrapartum Infection:  Goal: Will show no infection signs and symptoms  Description: Will show no infection signs and symptoms  2020 by Victorino Royal RN  Note: Vitals stable, pt remains afebrile. FHT's remain reassuring, will continue to monitor. 2020 by Victorino Royal RN  Outcome: Ongoing     Problem: Labor Process - Prolonged:  Goal: Uterine contractions within specified parameters  Description: Uterine contractions within specified parameters  2020 by Victorino Royal RN  Note: Contractions monitored as ordered. 2020 by Victorino Royal RN  Outcome: Ongoing     Problem:  Screening:  Goal: Ability to make informed decisions regarding treatment has improved  Description: Ability to make informed decisions regarding treatment has improved  2020 by Victorino Royal RN  Note: Pt able to make informed decisions.   2020 by Yony Knapp DINORA Lott  Outcome: Ongoing     Problem: Pain - Acute:  Goal: Able to cope with pain  Description: Able to cope with pain  11/9/2020 0029 by Karolyn Aguayo RN  Note: Pt rating pain 9/10. Pain goal is a 5/10. Pt does not want an epidural for pain, requesting IV pain medication but is too far dilated at this time. Emotional support provided. 11/9/2020 0028 by Karolyn Aguayo RN  Outcome: Ongoing     Problem: Tissue Perfusion - Uteroplacental, Altered:  Goal: Absence of abnormal fetal heart rate pattern  Description: Absence of abnormal fetal heart rate pattern  11/9/2020 0029 by Karolyn Aguayo RN  Note: Fetal tracing remains reactive    11/9/2020 0028 by Karolyn Aguayo RN  Outcome: Ongoing     Problem: Urinary Retention:  Goal: Urinary elimination within specified parameters  Description: Urinary elimination within specified parameters  11/9/2020 0029 by Karolyn Aguayo RN  Note: Pt voids with relief. 11/9/2020 0028 by Karolyn Aguayo RN  Outcome: Ongoing   Care plan reviewed with patient and family. Patient and family verbalize understanding of the plan of care and contribute to goal setting.

## 2020-11-09 NOTE — ANESTHESIA PRE PROCEDURE
Department of Anesthesiology  Preprocedure Note       Name:  Chelo Ambrose   Age:  26 y.o.  :  1990                                          MRN:  [id number]         Date:  2020      Surgeon: * No surgeons listed *    Procedure: * No procedures listed *    Medications prior to admission:   Prior to Admission medications    Not on File       Current medications:    Current Facility-Administered Medications   Medication Dose Route Frequency Provider Last Rate Last Dose    lactated ringers infusion   Intravenous Continuous Jluis Jalloh  mL/hr at 20 0034      lidocaine PF 1 % injection 30 mL  30 mL Other PRN Jluis Jalloh MD        butorphanol (STADOL) injection 1 mg  1 mg Intravenous Q1H PRN Jluis Jalloh MD        acetaminophen (TYLENOL) tablet 650 mg  650 mg Oral Q4H PRN Jluis Jalloh MD        ibuprofen (ADVIL;MOTRIN) tablet 800 mg  800 mg Oral Q8H PRN Jluis Jalloh MD        morphine (PF) injection 2 mg  2 mg Intravenous Q2H PRN Jluis Jalloh MD        Or   Floydene Ada morphine injection 4 mg  4 mg Intravenous Q2H PRN Jluis Jalloh MD        oxytocin (PITOCIN) 30 units in 500 mL infusion  1 kirill-units/min Intravenous Continuous Jluis Jalloh MD        diphenhydrAMINE (BENADRYL) injection 25 mg  25 mg Intravenous Q4H PRN Jluis Jalloh MD        [facility]ansetron [facility]) injection 8 mg  8 mg Intravenous Q6H PRN Jluis Jalloh MD        oxytocin (PITOCIN) 10 unit bolus from the bag  10 Units Intravenous PRN Jluis Jalloh MD        And    oxytocin (PITOCIN) 30 units in 500 mL infusion  95 kirill-units/min Intravenous PRN Jluis Jalloh MD        methylergonovine (METHERGINE) injection 200 mcg  200 mcg Intramuscular PRN Jluis Jalloh MD        miSOPROStol (CYTOTEC) tablet 1,000 mcg  1,000 mcg Rectal PRN Melvena Panda, MD        witch hazel-glycerin (TUCKS) pad   Topical PRN Jluis Jalloh MD       Floydene Ada Date     11/08/2020    K 4.8 11/08/2020     11/08/2020    CO2 20 11/08/2020    BUN 14 11/08/2020    CREATININE 0.5 11/08/2020    LABGLOM >90 11/08/2020    GLUCOSE 82 11/08/2020    PROT 7.9 04/10/2019    CALCIUM 9.3 11/08/2020    BILITOT 0.4 04/10/2019    ALKPHOS 100 04/10/2019    AST 80 04/10/2019     04/10/2019       POC Tests: No results for input(s): POCGLU, POCNA, POCK, POCCL, POCBUN, POCHEMO, POCHCT in the last 72 hours. Coags: No results found for: PROTIME, INR, APTT    HCG (If Applicable):   Lab Results   Component Value Date    PREGTESTUR negative 04/10/2019    PREGSERUM NEGATIVE 10/30/2011        ABGs: No results found for: PHART, PO2ART, BKD8CTE, ITG8UIZ, BEART, N4VZYDSG     Type & Screen (If Applicable):  Lab Results   Component Value Date    LABABO O 06/27/2018    [facility] POS 11/08/2020       Drug/Infectious Status (If Applicable):  No results found for: HIV, HEPCAB    COVID-19 Screening (If Applicable): No results found for: COVID19      Anesthesia Evaluation  Patient summary reviewed and Nursing notes reviewed  Airway: Mallampati: III  TM distance: >3 FB   Neck ROM: full  Mouth opening: > = 3 FB Dental: normal exam         Pulmonary:Negative Pulmonary ROS and normal exam                               Cardiovascular:Negative CV ROS                      Neuro/Psych:   Negative Neuro/Psych ROS              GI/Hepatic/Renal: Neg GI/Hepatic/Renal ROS  (+) morbid obesity          Endo/Other:                     Abdominal:   (+) obese,         Vascular: negative vascular ROS. Anesthesia Plan      epidural     ASA 3             Anesthetic plan and risks discussed with patient and spouse. Plan discussed with CRNA and attending.     Attending anesthesiologist reviewed and agrees with [address], APRN - CRNA   11/9/2020

## 2020-11-09 NOTE — FLOWSHEET NOTE
Pt informed of maternal drug testing policy in place on all laboring patients. Verbal consent received, paper consent to be signed and urine to be sent.

## 2020-11-09 NOTE — PROGRESS NOTES
Department of Obstetrics and Gynecology  Labor and Delivery  Attending Post Partum Progress Note    PPD #0    SUBJECTIVE: Feeling well. No complaints. OBJECTIVE:     Vitals:  BP (!) 111/55   Pulse 80   Temp 98.5 °F (36.9 °C) (Oral)   Resp 18   Ht 4' 9\" (1.448 m)   Wt 207 lb 3.2 oz (94 kg)   SpO2 98%   Breastfeeding Unknown   BMI 44.84 kg/m²     Uterus:  normal size, well involuted, firm, non-tender    DATA:    Hemoglobin:   Lab Results   Component Value Date    HGB 10.1 11/08/2020       ASSESSMENT & PLAN:  Doing well. Pt interested in home tomorrow if mom and baby are doing well.     Qi Ferrell 11/9/2020

## 2020-11-09 NOTE — PLAN OF CARE
Problem: Pain:  Goal: Pain level will decrease  Description: Pain level will decrease  11/9/2020 1105 by Ceci Miranda RN  Outcome: Ongoing  Note: Pain controlled with po meds. Discussed ice for perineal pain  or the use of warm blanket/heating pad for uterine cramps. Pt states her pain goal 5/10 has been met. Problem: Discharge Planning:  Goal: Discharged to appropriate level of care  Description: Discharged to appropriate level of care  11/9/2020 1105 by Ceci Miranda RN  Outcome: Ongoing  Note: Remains in hospital, discussed possible discharge needs. Problem: Constipation:  Goal: Bowel elimination is within specified parameters  Description: Bowel elimination is within specified parameters  11/9/2020 1105 by Ceci Miranda RN  Outcome: Ongoing  Note: Taking stool softeners and increasing fiber and fluid in diet. Ambulation encouraged. Problem: Fluid Volume - Imbalance:  Goal: Absence of postpartum hemorrhage signs and symptoms  Description: Absence of postpartum hemorrhage signs and symptoms  11/9/2020 1105 by Ceci Miranda RN  Outcome: Ongoing  Note: Vaginal bleeding WNL, no clots or foul odors. Problem: Infection - Risk of, Puerperal Infection:  Goal: Will show no infection signs and symptoms  Description: Will show no infection signs and symptoms  11/9/2020 1105 by Ceci Miranda RN  Outcome: Ongoing  Note: Vital signs and assessments WNL. Problem: Mood - Altered:  Goal: Mood stable  Description: Mood stable  11/9/2020 1105 by Ceci Miranda RN  Outcome: Ongoing  Note: Bonding with baby, participating in infant care. Problem: Pain - Acute:  Goal: Pain level will decrease  Description: Pain level will decrease  11/9/2020 1105 by Ceci Miranda RN  Outcome: Ongoing  Note: Pain controlled with po meds. Discussed ice for perineal pain  or the use of warm blanket/heating pad for uterine cramps. Pt states her pain goal 5/10 has been met.     Care plan reviewed with patient and she contributes to goal setting and voices understanding of plan of care.

## 2020-11-09 NOTE — H&P
[facility]  History and Physical Update    Pt Name: Luis Baker  MRN: [id number]  YOB: 1990  Date of evaluation: 11/9/2020    [x] I have examined the patient and reviewed the H&P/Consult and there are no changes to the patient or plans. LABOR. AROM. 8-9 CM, VTX, CAT 1 FHT'S    [] I have examined the patient and reviewed the H&P/Consult and have noted the following changes:    Discussion with the patient and/ or family for proposed care, treatment, services; benefits, risks, side effects; likelihood of achieving goals and potential problems that may occur during recuperation was had and all questions were answered. Discussion with the patient and/ or family of reasonable alternatives to the proposed care, treatment, services and the discussion of the risks, benefits, side effects related to the alternatives and the risk related to not receiving the proposed care treatment services was also had and all questions were answered. For scheduled inductions of labor, please refer to the scheduling sheet for any maternal and / or fetal indications for the induction. They are not being placed here to avoid possible discrepancies and duplications in the medical record. Thank you. This will be/was done the day of admission/surgery in the pre op holding area or in L and D as applicable to this patient.         Jonathan Gutierrez MD  Electronically signed 11/9/2020 at 12:13 AM

## 2020-11-09 NOTE — OP NOTE
[address] [place] [address]                                OPERATIVE REPORT    PATIENT NAME: Rayne Marshall              :        1990  MED REC NO:   [id number]                           ROOM:       0005  ACCOUNT NO:   [de-identified]                           ADMIT DATE: 2020  PROVIDER:     Lucia Subramanian M.D.    DATE OF PROCEDURE:  2020    DELIVERY SUMMARY:  The patient presented to labor and delivery in active  labor. She underwent amniotomy for clear fluid. She received an  epidural, progressed to complete, was placed in dorsal lithotomy  position where she experienced a spontaneous vaginal delivery of a  viable infant male over an intact perineum. The infant was suctioned at  the nasal and oropharynx with delivery and was a nuchal cord x1  delivered over the infant's head prior to full delivery of the infant. The infant was fully delivered, placed on mother's abdomen. After  delayed cord clamping, the cord was clamped and cut and the infant was  removed from the operative field. Apgars and birth weight are pending  at the time of this dictation. A segment of cord obtained for  protocol-directed studies. Routine cord blood studies obtained as well. The placenta delivered spontaneously. Cervix, vagina, perineum, vulva  are all explored for any lacerations and she was found to have a  second-degree laceration to the posterior vaginal wall at the introitus. This was repaired with running 2-0 Chromic suture under the analgesia of  her epidural with an excellent anatomic and hemostatic effect. Vagina  was swept for any retained products or foreign bodies, none were found. Sponge counts correct. Two sharps disposed off. Mother and father and  their  infant son remained in the birthing room in good  condition. EBL with delivery was 300 mL.         Trent Hinkle M.D.    D: 2020 3:14:40       T: 11/09/2020 4:03:42     [id number]  Job#: [id number]     Doc#: [id number]    CC:   Kenzie Nogueira M.D.

## 2020-11-09 NOTE — PLAN OF CARE
Problem: Pain:  Goal: Pain level will decrease  Description: Pain level will decrease  Outcome: Ongoing  Note: Pain controlled with po meds. Discussed ice for perineal pain and/or incisional pain or the use of warm blanket/heating pad for uterine cramps. Pt states her pain goal 2/10. Problem: Pain:  Goal: Control of acute pain  Description: Control of acute pain  Outcome: Ongoing     Problem: Pain:  Goal: Control of chronic pain  Description: Control of chronic pain  Outcome: Ongoing     Problem: Discharge Planning:  Goal: Discharged to appropriate level of care  Description: Discharged to appropriate level of care  Outcome: Ongoing  Note: Pt admitted to , no discharge order for this shift. Problem: Constipation:  Goal: Bowel elimination is within specified parameters  Description: Bowel elimination is within specified parameters  Outcome: Ongoing  Note: Pt denies passing gas, educated pt on ways to promote bowel functioning, understanding verbalized     Problem: Fluid Volume - Imbalance:  Goal: Absence of postpartum hemorrhage signs and symptoms  Description: Absence of postpartum hemorrhage signs and symptoms  Outcome: Ongoing  Note: Small amount of lochia noted with assessment     Problem: Infection - Risk of, Puerperal Infection:  Goal: Will show no infection signs and symptoms  Description: Will show no infection signs and symptoms  Outcome: Ongoing  Note: Vital signs stable     Problem: Mood - Altered:  Goal: Mood stable  Description: Mood stable  Outcome: Ongoing  Note: Pt pleasant with visitors and staff as well as with infant. Problem: Pain - Acute:  Goal: Pain level will decrease  Description: Pain level will decrease  Outcome: Ongoing  Note: Pain controlled with po meds. Discussed ice for perineal pain and/or incisional pain or the use of warm blanket/heating pad for uterine cramps. Pt states her pain goal 2/10.      Care plan reviewed with patient and she contributes to goal setting and

## 2020-11-10 VITALS
SYSTOLIC BLOOD PRESSURE: 99 MMHG | OXYGEN SATURATION: 98 % | TEMPERATURE: 98.2 F | WEIGHT: 207.2 LBS | BODY MASS INDEX: 44.7 KG/M2 | HEART RATE: 85 BPM | HEIGHT: 57 IN | DIASTOLIC BLOOD PRESSURE: 50 MMHG | RESPIRATION RATE: 16 BRPM

## 2020-11-10 PROCEDURE — 6370000000 HC RX 637 (ALT 250 FOR IP): Performed by: OBSTETRICS & GYNECOLOGY

## 2020-11-10 RX ADMIN — IBUPROFEN 800 MG: 800 TABLET, FILM COATED ORAL at 00:56

## 2020-11-10 RX ADMIN — IBUPROFEN 800 MG: 800 TABLET, FILM COATED ORAL at 16:51

## 2020-11-10 RX ADMIN — IBUPROFEN 800 MG: 800 TABLET, FILM COATED ORAL at 08:51

## 2020-11-10 RX ADMIN — DOCUSATE SODIUM 100 MG: 100 CAPSULE, LIQUID FILLED ORAL at 08:51

## 2020-11-10 RX ADMIN — ACETAMINOPHEN 650 MG: 325 TABLET ORAL at 05:23

## 2020-11-10 ASSESSMENT — PAIN SCALES - GENERAL
PAINLEVEL_OUTOF10: 7
PAINLEVEL_OUTOF10: 6

## 2020-11-10 NOTE — PLAN OF CARE
Problem: Pain:  Goal: Pain level will decrease  Description: Pain level will decrease  Outcome: Met This Shift  Note: Pain controlled with po meds. Discussed ice for perineal pain and the use of warm blanket/heating pad for uterine cramps. Pt states her pain goal 6/10 has been met. Problem: Discharge Planning:  Goal: Discharged to appropriate level of care  Description: Discharged to appropriate level of care  Outcome: Met This Shift  Note: Discharge to home today. Problem: Constipation:  Goal: Bowel elimination is within specified parameters  Description: Bowel elimination is within specified parameters  Outcome: Met This Shift  Note: Encouraged to increase fiber and fluids. Pt taking stool softeners       Problem: Fluid Volume - Imbalance:  Goal: Absence of postpartum hemorrhage signs and symptoms  Description: Absence of postpartum hemorrhage signs and symptoms  Outcome: Met This Shift  Note: Small amount of lochia       Problem: Infection - Risk of, Puerperal Infection:  Goal: Will show no infection signs and symptoms  Description: Will show no infection signs and symptoms  Outcome: Met This Shift  Note: No signs of infection       Problem: Mood - Altered:  Goal: Mood stable  Description: Mood stable  Outcome: Met This Shift  Note: Calm and cooperative       Problem: Pain - Acute:  Goal: Pain level will decrease  Description: Pain level will decrease  Outcome: Met This Shift  Note: Pain controlled with po meds. Discussed ice for perineal pain and the use of warm blanket/heating pad for uterine cramps. Pt states her pain goal 6/10 has been met. Care plan reviewed with patient and she contributes to goal setting and voices understanding of plan of care.

## 2020-11-10 NOTE — FLOWSHEET NOTE
Post birth warning signs education paper given and reviewed, teaching complete. [place] postpartum depression screening discussed with patient, handout given in [facility] [place] and [place], instructed to contact her healthcare provider if her score is > 10. Patient voiced understanding. Mother's blood type is O+. Baby's blood type is B+ 1+ el.   Mother did not receive Rhogam.

## 2020-11-10 NOTE — PLAN OF CARE
Problem: Pain:  Goal: Pain level will decrease  Description: Pain level will decrease  11/9/2020 2207 by Ludwin Mitchell RN  Outcome: Ongoing  Note: Pain controlled with po meds. Discussed ice for perineal pain or the use of warm blanket/heating pad for uterine cramps. Pt states her pain goal 5/10 has been met. Problem: Discharge Planning:  Goal: Discharged to appropriate level of care  Description: Discharged to appropriate level of care  11/9/2020 2207 by Ludwin Mitchell RN  Outcome: Ongoing  Note: Remains in hospital, discussed possible discharge needs. Problem: Constipation:  Goal: Bowel elimination is within specified parameters  Description: Bowel elimination is within specified parameters  11/9/2020 2207 by Ludwin Mitchell RN  Outcome: Ongoing  Note: Taking stool softeners and increasing fiber and fluid in diet. Ambulation encouraged. Problem: Fluid Volume - Imbalance:  Goal: Absence of postpartum hemorrhage signs and symptoms  Description: Absence of postpartum hemorrhage signs and symptoms  11/9/2020 2207 by Ludwin Mitchell RN  Outcome: Ongoing  Note: Vaginal bleeding WNL, no clots or foul odors. Problem: Infection - Risk of, Puerperal Infection:  Goal: Will show no infection signs and symptoms  Description: Will show no infection signs and symptoms  11/9/2020 2207 by Ludwin Mitchell RN  Outcome: Ongoing  Note: Patient shows no sign/symptoms of infection. Problem: Mood - Altered:  Goal: Mood stable  Description: Mood stable  11/9/2020 2207 by Ludwin Mitchell RN  Outcome: Ongoing  Note: Bonding with baby, participating in infant care. Problem: Pain - Acute:  Goal: Pain level will decrease  Description: Pain level will decrease  11/9/2020 2207 by Ludwin Mitchell RN  Outcome: Ongoing  Note: Pain controlled with po meds. Discussed ice for perineal pain or the use of warm blanket/heating pad for uterine cramps. Pt states her pain goal 5/10 has been met. Care plan reviewed with patient and she contributes to goal setting and voices understanding of plan of care.

## 2020-11-10 NOTE — FLOWSHEET NOTE
Discharge instructions given. See AVS. Pt verbalized understanding. Postpartum  teaching completed and forms signed by patient. Copy witnessed by RN and given to patient. Patient verbalized understanding of all teaching points. Patient plans to follow-up with [facility] Provider as instructed. Patient verbalizes understanding of discharge instructions and denies further questions. ID bands checked. Patient discharged in stable condition accompanied by family/guardian. Discharged in wheelchair, holding baby in arms.

## 2020-11-10 NOTE — FLOWSHEET NOTE
Infant has roomed in with mother this shift except for circumcision procedure. Benefits of rooming in discussed.

## 2020-11-10 NOTE — LACTATION NOTE
Provided pt. With [place] breast feeding booklet. Pt. Stated she has no questions at this time. Pt. Stated breastfeeding is going well. Encouraged pt. To call lactation for further assistance. Encouraged pt. To attend support group.

## 2021-12-19 ENCOUNTER — APPOINTMENT (OUTPATIENT)
Dept: ULTRASOUND IMAGING | Age: 31
DRG: 871 | End: 2021-12-19

## 2021-12-19 ENCOUNTER — HOSPITAL ENCOUNTER (INPATIENT)
Age: 31
LOS: 1 days | Discharge: HOME | DRG: 871 | End: 2021-12-21
Attending: EMERGENCY MEDICINE | Admitting: PEDIATRICS

## 2021-12-19 ENCOUNTER — APPOINTMENT (OUTPATIENT)
Dept: GENERAL RADIOLOGY | Age: 31
DRG: 871 | End: 2021-12-19

## 2021-12-19 DIAGNOSIS — J12.82 PNEUMONIA DUE TO 2019-NCOV: ICD-10-CM

## 2021-12-19 DIAGNOSIS — R74.01 TRANSAMINITIS: Primary | ICD-10-CM

## 2021-12-19 DIAGNOSIS — U07.1 PNEUMONIA DUE TO 2019-NCOV: ICD-10-CM

## 2021-12-19 DIAGNOSIS — U07.1 PNEUMONIA DUE TO COVID-19 VIRUS: ICD-10-CM

## 2021-12-19 DIAGNOSIS — J12.82 PNEUMONIA DUE TO COVID-19 VIRUS: ICD-10-CM

## 2021-12-19 DIAGNOSIS — U07.1 COVID-19: ICD-10-CM

## 2021-12-19 LAB
ALBUMIN SERPL-MCNC: 4.4 G/DL (ref 3.5–5.1)
ALP BLD-CCNC: 112 U/L (ref 38–126)
ALT SERPL-CCNC: 1245 U/L (ref 11–66)
ANION GAP SERPL CALCULATED.3IONS-SCNC: 15 MEQ/L (ref 8–16)
AST SERPL-CCNC: 992 U/L (ref 5–40)
BASOPHILS # BLD: 0.3 %
BASOPHILS ABSOLUTE: 0 THOU/MM3 (ref 0–0.1)
BILIRUB SERPL-MCNC: 0.5 MG/DL (ref 0.3–1.2)
BUN BLDV-MCNC: 9 MG/DL (ref 7–22)
CALCIUM SERPL-MCNC: 8.9 MG/DL (ref 8.5–10.5)
CHLORIDE BLD-SCNC: 100 MEQ/L (ref 98–111)
CO2: 21 MEQ/L (ref 23–33)
CREAT SERPL-MCNC: 0.6 MG/DL (ref 0.4–1.2)
EOSINOPHIL # BLD: 0 %
EOSINOPHILS ABSOLUTE: 0 THOU/MM3 (ref 0–0.4)
ERYTHROCYTE [DISTWIDTH] IN BLOOD BY AUTOMATED COUNT: 18.9 % (ref 11.5–14.5)
ERYTHROCYTE [DISTWIDTH] IN BLOOD BY AUTOMATED COUNT: 47.6 FL (ref 35–45)
FLU A ANTIGEN: NEGATIVE
FLU B ANTIGEN: NEGATIVE
GFR SERPL CREATININE-BSD FRML MDRD: > 90 ML/MIN/1.73M2
GLUCOSE BLD-MCNC: 134 MG/DL (ref 70–108)
HCT VFR BLD CALC: 43 % (ref 37–47)
HEMOGLOBIN: 13 GM/DL (ref 12–16)
IMMATURE GRANS (ABS): 0.02 THOU/MM3 (ref 0–0.07)
IMMATURE GRANULOCYTES: 0.3 %
LYMPHOCYTES # BLD: 9.2 %
LYMPHOCYTES ABSOLUTE: 0.7 THOU/MM3 (ref 1–4.8)
MCH RBC QN AUTO: 22.5 PG (ref 26–33)
MCHC RBC AUTO-ENTMCNC: 30.2 GM/DL (ref 32.2–35.5)
MCV RBC AUTO: 74.3 FL (ref 81–99)
MONOCYTES # BLD: 4.8 %
MONOCYTES ABSOLUTE: 0.4 THOU/MM3 (ref 0.4–1.3)
NUCLEATED RED BLOOD CELLS: 0 /100 WBC
OSMOLALITY CALCULATION: 272.6 MOSMOL/KG (ref 275–300)
PLATELET # BLD: 200 THOU/MM3 (ref 130–400)
PMV BLD AUTO: 12.5 FL (ref 9.4–12.4)
POTASSIUM REFLEX MAGNESIUM: 3.9 MEQ/L (ref 3.5–5.2)
PREGNANCY, SERUM: NEGATIVE
PROCALCITONIN: 0.48 NG/ML (ref 0.01–0.09)
RBC # BLD: 5.79 MILL/MM3 (ref 4.2–5.4)
SARS-COV-2, NAAT: DETECTED
SEG NEUTROPHILS: 85.4 %
SEGMENTED NEUTROPHILS ABSOLUTE COUNT: 6.4 THOU/MM3 (ref 1.8–7.7)
SODIUM BLD-SCNC: 136 MEQ/L (ref 135–145)
TOTAL PROTEIN: 8.1 G/DL (ref 6.1–8)
WBC # BLD: 7.5 THOU/MM3 (ref 4.8–10.8)

## 2021-12-19 PROCEDURE — 85025 COMPLETE CBC W/AUTO DIFF WBC: CPT

## 2021-12-19 PROCEDURE — 80074 ACUTE HEPATITIS PANEL: CPT

## 2021-12-19 PROCEDURE — 83605 ASSAY OF LACTIC ACID: CPT

## 2021-12-19 PROCEDURE — 84703 CHORIONIC GONADOTROPIN ASSAY: CPT

## 2021-12-19 PROCEDURE — 83690 ASSAY OF LIPASE: CPT

## 2021-12-19 PROCEDURE — 87635 SARS-COV-2 COVID-19 AMP PRB: CPT

## 2021-12-19 PROCEDURE — 71046 X-RAY EXAM CHEST 2 VIEWS: CPT

## 2021-12-19 PROCEDURE — 76705 ECHO EXAM OF ABDOMEN: CPT

## 2021-12-19 PROCEDURE — 87804 INFLUENZA ASSAY W/OPTIC: CPT

## 2021-12-19 PROCEDURE — 80053 COMPREHEN METABOLIC PANEL: CPT

## 2021-12-19 PROCEDURE — 80143 DRUG ASSAY ACETAMINOPHEN: CPT

## 2021-12-19 PROCEDURE — 99283 EMERGENCY DEPT VISIT LOW MDM: CPT

## 2021-12-19 PROCEDURE — 36415 COLL VENOUS BLD VENIPUNCTURE: CPT

## 2021-12-19 PROCEDURE — 6370000000 HC RX 637 (ALT 250 FOR IP): Performed by: NURSE PRACTITIONER

## 2021-12-19 PROCEDURE — 84145 PROCALCITONIN (PCT): CPT

## 2021-12-19 RX ORDER — ONDANSETRON 4 MG/1
4 TABLET, ORALLY DISINTEGRATING ORAL ONCE
Status: COMPLETED | OUTPATIENT
Start: 2021-12-19 | End: 2021-12-19

## 2021-12-19 RX ORDER — ACETAMINOPHEN 500 MG
1000 TABLET ORAL ONCE
Status: COMPLETED | OUTPATIENT
Start: 2021-12-19 | End: 2021-12-19

## 2021-12-19 RX ADMIN — ACETAMINOPHEN 1000 MG: 500 TABLET, FILM COATED ORAL at 21:55

## 2021-12-19 RX ADMIN — ONDANSETRON 4 MG: 4 TABLET, ORALLY DISINTEGRATING ORAL at 21:55

## 2021-12-20 PROBLEM — U07.1 PNEUMONIA DUE TO COVID-19 VIRUS: Status: ACTIVE | Noted: 2021-12-20

## 2021-12-20 PROBLEM — J12.82 PNEUMONIA DUE TO COVID-19 VIRUS: Status: ACTIVE | Noted: 2021-12-20

## 2021-12-20 LAB
ACETAMINOPHEN LEVEL: < 5 UG/ML (ref 0–20)
ALBUMIN SERPL-MCNC: 3.7 G/DL (ref 3.5–5.1)
ALP BLD-CCNC: 95 U/L (ref 38–126)
ALT SERPL-CCNC: 913 U/L (ref 11–66)
AMPHETAMINE+METHAMPHETAMINE URINE SCREEN: NEGATIVE
ANION GAP SERPL CALCULATED.3IONS-SCNC: 12 MEQ/L (ref 8–16)
AST SERPL-CCNC: 630 U/L (ref 5–40)
BARBITURATE QUANTITATIVE URINE: NEGATIVE
BASOPHILS # BLD: 0.2 %
BASOPHILS ABSOLUTE: 0 THOU/MM3 (ref 0–0.1)
BENZODIAZEPINE QUANTITATIVE URINE: NEGATIVE
BILIRUB SERPL-MCNC: 0.5 MG/DL (ref 0.3–1.2)
BUN BLDV-MCNC: 6 MG/DL (ref 7–22)
C-REACTIVE PROTEIN: 2.06 MG/DL (ref 0–1)
CALCIUM SERPL-MCNC: 7.9 MG/DL (ref 8.5–10.5)
CANNABINOID QUANTITATIVE URINE: NEGATIVE
CHLORIDE BLD-SCNC: 106 MEQ/L (ref 98–111)
CO2: 21 MEQ/L (ref 23–33)
COCAINE METABOLITE QUANTITATIVE URINE: NEGATIVE
CREAT SERPL-MCNC: 0.5 MG/DL (ref 0.4–1.2)
EOSINOPHIL # BLD: 0 %
EOSINOPHILS ABSOLUTE: 0 THOU/MM3 (ref 0–0.4)
ERYTHROCYTE [DISTWIDTH] IN BLOOD BY AUTOMATED COUNT: 18.1 % (ref 11.5–14.5)
ERYTHROCYTE [DISTWIDTH] IN BLOOD BY AUTOMATED COUNT: 46.9 FL (ref 35–45)
FERRITIN: 267 NG/ML (ref 10–291)
GFR SERPL CREATININE-BSD FRML MDRD: > 90 ML/MIN/1.73M2
GLUCOSE BLD-MCNC: 172 MG/DL (ref 70–108)
HAV IGM SER IA-ACNC: NEGATIVE
HCT VFR BLD CALC: 36.5 % (ref 37–47)
HEMOGLOBIN: 11.3 GM/DL (ref 12–16)
HEPATITIS B CORE IGM ANTIBODY: NEGATIVE
HEPATITIS B SURFACE ANTIGEN: NEGATIVE
HEPATITIS C ANTIBODY: NEGATIVE
IMMATURE GRANS (ABS): 0.01 THOU/MM3 (ref 0–0.07)
IMMATURE GRANULOCYTES: 0.2 %
INR BLD: 1.07 (ref 0.85–1.13)
INR BLD: 1.1 (ref 0.85–1.13)
INR BLD: 1.24 (ref 0.85–1.13)
LACTIC ACID: 1.3 MMOL/L (ref 0.5–2)
LD: 688 U/L (ref 100–190)
LIPASE: 28.3 U/L (ref 5.6–51.3)
LYMPHOCYTES # BLD: 15.6 %
LYMPHOCYTES ABSOLUTE: 0.8 THOU/MM3 (ref 1–4.8)
MCH RBC QN AUTO: 22.7 PG (ref 26–33)
MCHC RBC AUTO-ENTMCNC: 31 GM/DL (ref 32.2–35.5)
MCV RBC AUTO: 73.4 FL (ref 81–99)
MONOCYTES # BLD: 6 %
MONOCYTES ABSOLUTE: 0.3 THOU/MM3 (ref 0.4–1.3)
NUCLEATED RED BLOOD CELLS: 0 /100 WBC
OPIATES, URINE: NEGATIVE
OXYCODONE: NEGATIVE
PHENCYCLIDINE QUANTITATIVE URINE: NEGATIVE
PLATELET # BLD: 172 THOU/MM3 (ref 130–400)
PMV BLD AUTO: 12.1 FL (ref 9.4–12.4)
POTASSIUM REFLEX MAGNESIUM: 3.9 MEQ/L (ref 3.5–5.2)
RBC # BLD: 4.97 MILL/MM3 (ref 4.2–5.4)
SEG NEUTROPHILS: 78 %
SEGMENTED NEUTROPHILS ABSOLUTE COUNT: 3.9 THOU/MM3 (ref 1.8–7.7)
SODIUM BLD-SCNC: 139 MEQ/L (ref 135–145)
TOTAL PROTEIN: 6.7 G/DL (ref 6.1–8)
WBC # BLD: 5 THOU/MM3 (ref 4.8–10.8)

## 2021-12-20 PROCEDURE — 85025 COMPLETE CBC W/AUTO DIFF WBC: CPT

## 2021-12-20 PROCEDURE — 6360000002 HC RX W HCPCS: Performed by: INTERNAL MEDICINE

## 2021-12-20 PROCEDURE — 82728 ASSAY OF FERRITIN: CPT

## 2021-12-20 PROCEDURE — 6360000002 HC RX W HCPCS: Performed by: PHYSICIAN ASSISTANT

## 2021-12-20 PROCEDURE — 85610 PROTHROMBIN TIME: CPT

## 2021-12-20 PROCEDURE — 2580000003 HC RX 258: Performed by: PHYSICIAN ASSISTANT

## 2021-12-20 PROCEDURE — 2580000003 HC RX 258: Performed by: NURSE PRACTITIONER

## 2021-12-20 PROCEDURE — 86140 C-REACTIVE PROTEIN: CPT

## 2021-12-20 PROCEDURE — 80053 COMPREHEN METABOLIC PANEL: CPT

## 2021-12-20 PROCEDURE — 2580000003 HC RX 258: Performed by: INTERNAL MEDICINE

## 2021-12-20 PROCEDURE — 6360000002 HC RX W HCPCS: Performed by: NURSE PRACTITIONER

## 2021-12-20 PROCEDURE — 80307 DRUG TEST PRSMV CHEM ANLYZR: CPT

## 2021-12-20 PROCEDURE — 83615 LACTATE (LD) (LDH) ENZYME: CPT

## 2021-12-20 PROCEDURE — 87040 BLOOD CULTURE FOR BACTERIA: CPT

## 2021-12-20 PROCEDURE — 36415 COLL VENOUS BLD VENIPUNCTURE: CPT

## 2021-12-20 PROCEDURE — 6370000000 HC RX 637 (ALT 250 FOR IP): Performed by: NURSE PRACTITIONER

## 2021-12-20 PROCEDURE — 6370000000 HC RX 637 (ALT 250 FOR IP): Performed by: PHYSICIAN ASSISTANT

## 2021-12-20 PROCEDURE — 2500000003 HC RX 250 WO HCPCS: Performed by: INTERNAL MEDICINE

## 2021-12-20 PROCEDURE — 99223 1ST HOSP IP/OBS HIGH 75: CPT | Performed by: PHYSICIAN ASSISTANT

## 2021-12-20 PROCEDURE — 1200000000 HC SEMI PRIVATE

## 2021-12-20 RX ORDER — SODIUM CHLORIDE 0.9 % (FLUSH) 0.9 %
5-40 SYRINGE (ML) INJECTION EVERY 12 HOURS SCHEDULED
Status: DISCONTINUED | OUTPATIENT
Start: 2021-12-20 | End: 2021-12-21 | Stop reason: HOSPADM

## 2021-12-20 RX ORDER — POTASSIUM CHLORIDE 7.45 MG/ML
10 INJECTION INTRAVENOUS PRN
Status: DISCONTINUED | OUTPATIENT
Start: 2021-12-20 | End: 2021-12-21 | Stop reason: HOSPADM

## 2021-12-20 RX ORDER — POTASSIUM CHLORIDE 20 MEQ/1
40 TABLET, EXTENDED RELEASE ORAL PRN
Status: DISCONTINUED | OUTPATIENT
Start: 2021-12-20 | End: 2021-12-21 | Stop reason: HOSPADM

## 2021-12-20 RX ORDER — ALBUTEROL SULFATE 90 UG/1
2 AEROSOL, METERED RESPIRATORY (INHALATION) EVERY 6 HOURS PRN
Status: DISCONTINUED | OUTPATIENT
Start: 2021-12-20 | End: 2021-12-21 | Stop reason: HOSPADM

## 2021-12-20 RX ORDER — ZINC SULFATE 50(220)MG
50 CAPSULE ORAL DAILY
Status: DISCONTINUED | OUTPATIENT
Start: 2021-12-20 | End: 2021-12-21 | Stop reason: HOSPADM

## 2021-12-20 RX ORDER — VITAMIN B COMPLEX
2000 TABLET ORAL DAILY
Status: DISCONTINUED | OUTPATIENT
Start: 2021-12-20 | End: 2021-12-21 | Stop reason: HOSPADM

## 2021-12-20 RX ORDER — GUAIFENESIN 600 MG/1
600 TABLET, EXTENDED RELEASE ORAL 2 TIMES DAILY
Status: DISCONTINUED | OUTPATIENT
Start: 2021-12-20 | End: 2021-12-21 | Stop reason: HOSPADM

## 2021-12-20 RX ORDER — SODIUM CHLORIDE 9 MG/ML
25 INJECTION, SOLUTION INTRAVENOUS PRN
Status: DISCONTINUED | OUTPATIENT
Start: 2021-12-20 | End: 2021-12-21 | Stop reason: HOSPADM

## 2021-12-20 RX ORDER — BENZONATATE 100 MG/1
100 CAPSULE ORAL 3 TIMES DAILY PRN
Status: DISCONTINUED | OUTPATIENT
Start: 2021-12-20 | End: 2021-12-21 | Stop reason: HOSPADM

## 2021-12-20 RX ORDER — BENZONATATE 100 MG/1
100 CAPSULE ORAL ONCE
Status: COMPLETED | OUTPATIENT
Start: 2021-12-20 | End: 2021-12-20

## 2021-12-20 RX ORDER — ASCORBIC ACID 500 MG
500 TABLET ORAL DAILY
Status: DISCONTINUED | OUTPATIENT
Start: 2021-12-20 | End: 2021-12-21 | Stop reason: HOSPADM

## 2021-12-20 RX ORDER — DIPHENHYDRAMINE HYDROCHLORIDE 50 MG/ML
25 INJECTION INTRAMUSCULAR; INTRAVENOUS ONCE
Status: COMPLETED | OUTPATIENT
Start: 2021-12-20 | End: 2021-12-20

## 2021-12-20 RX ORDER — ONDANSETRON 4 MG/1
4 TABLET, ORALLY DISINTEGRATING ORAL EVERY 8 HOURS PRN
Status: DISCONTINUED | OUTPATIENT
Start: 2021-12-20 | End: 2021-12-21 | Stop reason: HOSPADM

## 2021-12-20 RX ORDER — SODIUM CHLORIDE, SODIUM LACTATE, POTASSIUM CHLORIDE, AND CALCIUM CHLORIDE .6; .31; .03; .02 G/100ML; G/100ML; G/100ML; G/100ML
1000 INJECTION, SOLUTION INTRAVENOUS ONCE
Status: COMPLETED | OUTPATIENT
Start: 2021-12-20 | End: 2021-12-20

## 2021-12-20 RX ORDER — POLYETHYLENE GLYCOL 3350 17 G/17G
17 POWDER, FOR SOLUTION ORAL DAILY PRN
Status: DISCONTINUED | OUTPATIENT
Start: 2021-12-20 | End: 2021-12-21 | Stop reason: HOSPADM

## 2021-12-20 RX ORDER — DEXAMETHASONE SODIUM PHOSPHATE 4 MG/ML
6 INJECTION, SOLUTION INTRA-ARTICULAR; INTRALESIONAL; INTRAMUSCULAR; INTRAVENOUS; SOFT TISSUE EVERY 24 HOURS
Status: DISCONTINUED | OUTPATIENT
Start: 2021-12-20 | End: 2021-12-20

## 2021-12-20 RX ORDER — 0.9 % SODIUM CHLORIDE 0.9 %
1000 INTRAVENOUS SOLUTION INTRAVENOUS ONCE
Status: COMPLETED | OUTPATIENT
Start: 2021-12-20 | End: 2021-12-20

## 2021-12-20 RX ORDER — SODIUM CHLORIDE, SODIUM LACTATE, POTASSIUM CHLORIDE, CALCIUM CHLORIDE 600; 310; 30; 20 MG/100ML; MG/100ML; MG/100ML; MG/100ML
INJECTION, SOLUTION INTRAVENOUS CONTINUOUS
Status: DISCONTINUED | OUTPATIENT
Start: 2021-12-20 | End: 2021-12-21 | Stop reason: HOSPADM

## 2021-12-20 RX ORDER — SODIUM CHLORIDE 0.9 % (FLUSH) 0.9 %
5-40 SYRINGE (ML) INJECTION PRN
Status: DISCONTINUED | OUTPATIENT
Start: 2021-12-20 | End: 2021-12-21 | Stop reason: HOSPADM

## 2021-12-20 RX ORDER — ONDANSETRON 2 MG/ML
4 INJECTION INTRAMUSCULAR; INTRAVENOUS EVERY 6 HOURS PRN
Status: DISCONTINUED | OUTPATIENT
Start: 2021-12-20 | End: 2021-12-21 | Stop reason: HOSPADM

## 2021-12-20 RX ADMIN — FAMOTIDINE 20 MG: 10 INJECTION, SOLUTION INTRAVENOUS at 08:53

## 2021-12-20 RX ADMIN — SODIUM CHLORIDE 1000 ML: 9 INJECTION, SOLUTION INTRAVENOUS at 02:35

## 2021-12-20 RX ADMIN — BENZONATATE 100 MG: 100 CAPSULE ORAL at 02:35

## 2021-12-20 RX ADMIN — SODIUM CHLORIDE, POTASSIUM CHLORIDE, SODIUM LACTATE AND CALCIUM CHLORIDE 1000 ML: 600; 310; 30; 20 INJECTION, SOLUTION INTRAVENOUS at 08:58

## 2021-12-20 RX ADMIN — VANCOMYCIN HYDROCHLORIDE 1000 MG: 1 INJECTION, POWDER, LYOPHILIZED, FOR SOLUTION INTRAVENOUS at 04:40

## 2021-12-20 RX ADMIN — BENZONATATE 100 MG: 100 CAPSULE ORAL at 08:02

## 2021-12-20 RX ADMIN — BENZONATATE 100 MG: 100 CAPSULE ORAL at 16:46

## 2021-12-20 RX ADMIN — ENOXAPARIN SODIUM 30 MG: 100 INJECTION SUBCUTANEOUS at 08:04

## 2021-12-20 RX ADMIN — OXYCODONE HYDROCHLORIDE AND ACETAMINOPHEN 500 MG: 500 TABLET ORAL at 08:02

## 2021-12-20 RX ADMIN — GUAIFENESIN 600 MG: 600 TABLET, EXTENDED RELEASE ORAL at 08:02

## 2021-12-20 RX ADMIN — CEFTRIAXONE SODIUM 1000 MG: 1 INJECTION, POWDER, FOR SOLUTION INTRAMUSCULAR; INTRAVENOUS at 05:55

## 2021-12-20 RX ADMIN — SODIUM CHLORIDE, POTASSIUM CHLORIDE, SODIUM LACTATE AND CALCIUM CHLORIDE: 600; 310; 30; 20 INJECTION, SOLUTION INTRAVENOUS at 04:39

## 2021-12-20 RX ADMIN — SODIUM CHLORIDE, PRESERVATIVE FREE 10 ML: 5 INJECTION INTRAVENOUS at 21:20

## 2021-12-20 RX ADMIN — Medication 2000 UNITS: at 08:02

## 2021-12-20 RX ADMIN — Medication 50 MG: at 08:02

## 2021-12-20 RX ADMIN — SODIUM CHLORIDE, POTASSIUM CHLORIDE, SODIUM LACTATE AND CALCIUM CHLORIDE: 600; 310; 30; 20 INJECTION, SOLUTION INTRAVENOUS at 16:45

## 2021-12-20 RX ADMIN — AZITHROMYCIN DIHYDRATE 500 MG: 500 INJECTION, POWDER, LYOPHILIZED, FOR SOLUTION INTRAVENOUS at 02:34

## 2021-12-20 RX ADMIN — ENOXAPARIN SODIUM 30 MG: 100 INJECTION SUBCUTANEOUS at 21:20

## 2021-12-20 RX ADMIN — CASIRIVIMAB AND IMDEVIMAB: 600; 600 INJECTION, SOLUTION, CONCENTRATE INTRAVENOUS at 21:26

## 2021-12-20 RX ADMIN — GUAIFENESIN 600 MG: 600 TABLET, EXTENDED RELEASE ORAL at 21:20

## 2021-12-20 RX ADMIN — DIPHENHYDRAMINE HYDROCHLORIDE 25 MG: 50 INJECTION, SOLUTION INTRAMUSCULAR; INTRAVENOUS at 08:53

## 2021-12-20 RX ADMIN — ACETYLCYSTEINE 12940 MG: 200 INJECTION, SOLUTION INTRAVENOUS at 06:42

## 2021-12-20 RX ADMIN — DEXAMETHASONE SODIUM PHOSPHATE 6 MG: 4 INJECTION, SOLUTION INTRA-ARTICULAR; INTRALESIONAL; INTRAMUSCULAR; INTRAVENOUS; SOFT TISSUE at 04:53

## 2021-12-20 ASSESSMENT — PAIN SCALES - GENERAL
PAINLEVEL_OUTOF10: 6
PAINLEVEL_OUTOF10: 0
PAINLEVEL_OUTOF10: 8

## 2021-12-20 ASSESSMENT — ENCOUNTER SYMPTOMS
RHINORRHEA: 0
COUGH: 0
COLOR CHANGE: 0
WHEEZING: 0
CONSTIPATION: 0
SHORTNESS OF BREATH: 0
ABDOMINAL DISTENTION: 0
NAUSEA: 1
VOMITING: 0
SORE THROAT: 0
DIARRHEA: 0

## 2021-12-20 ASSESSMENT — PAIN - FUNCTIONAL ASSESSMENT: PAIN_FUNCTIONAL_ASSESSMENT: ACTIVITIES ARE NOT PREVENTED

## 2021-12-20 ASSESSMENT — PAIN DESCRIPTION - FREQUENCY: FREQUENCY: INTERMITTENT

## 2021-12-20 ASSESSMENT — PAIN DESCRIPTION - LOCATION
LOCATION: ABDOMEN;HEAD
LOCATION: HEAD;ABDOMEN
LOCATION: ABDOMEN

## 2021-12-20 ASSESSMENT — PAIN DESCRIPTION - PROGRESSION: CLINICAL_PROGRESSION: NOT CHANGED

## 2021-12-20 ASSESSMENT — PAIN DESCRIPTION - ORIENTATION
ORIENTATION: MID
ORIENTATION: MID

## 2021-12-20 ASSESSMENT — PAIN DESCRIPTION - PAIN TYPE
TYPE: ACUTE PAIN

## 2021-12-20 ASSESSMENT — PAIN DESCRIPTION - ONSET: ONSET: ON-GOING

## 2021-12-20 ASSESSMENT — PAIN DESCRIPTION - DESCRIPTORS: DESCRIPTORS: ACHING;HEADACHE

## 2021-12-20 NOTE — PROGRESS NOTES
PROGRESS NOTE      Patient:  Suzzanna Boas      Unit/Bed:8A-06/006-A    YOB: 1990    MRN: [id number]       Acct: [de-identified]     PCP: BERTO Truong CNP    Date of Admission: 12/19/2021      Assessment/Plan:      Active Hospital Problems    Diagnosis Date Noted    Pneumonia due to COVID-19 virus [U07.1, J12.82] 12/20/2021            COVID-19 infection: Patient has mild to moderate disease exhibited by being on room air and in no distress. She has fatigue which is consistent with mild to moderate disease. Her liver injury is likely due to COVID and significant Tylenol ingestion, however, that is improving as well. Extensive discussion had with the patient and her  regarding administration of Regeneron. Patient and  discussed and patient has requested that she receive Regeneron. They are aware of the risk of progression of disease and allergic reaction. They are aware that the medication is under emergency use authorization.  -We will order REGEN-COV for mild to moderate COVID-19 infection. Her risk factors for severe infection include BMI of 41. Transaminitis: As mentioned in H&P patient was taking too much Tylenol. However, her Tylenol level was undetectable. Her INR has returned to normal.  Her transaminases are improving as well. She also had a reaction with what I suspect was the N-acetylcysteine. She has an alert and oriented mental status without any asterixis. Suspect this was due to mild to moderate Covid and mild APAP toxicity.     Class III obesity   BMI 44.84 kg/m2      Chief Complaint: Cough, congestion, fevers, and nausea     Hospital Course:  12/19/2021 - Suzzanna Boas is a 33 y.o. female  who presents to [address] [place] [address] with symptoms of cough, congestion, fevers and nausea with some emesis. Patient states symptoms began last Wednesday and have persisted with worsening.  She states she has had intermittent spiking fevers but did endorse measured temperatures. Patient was found to have markedly elevated LFT's. She states she been taking tylenol for upwards of last 5 days in the form of tylenol tablets every four hours while awake but could not specify the dosing. Believed she was taking 2, 500 mg tablets each time. Additionally she has been taking gokul seltzer supplement containing 325 mg of acetaminophen per tablet with 2 tablets in the am and possibly up to four at night. Patient has has loss of appetite and nausea with vomoting every time she tries to eat or drink since onset of her URI symptoms unclear if tylenol ingestion contributed to GI symptoms. 12/20/2021-LFTs are improving. INR is stable. We are considering monoclonal antibody infusion. We will continue to follow LFTs. GI consult pending. Subjective:   Patient relates that she still feels more or less the same as yesterday. She does not admit to any current fevers or chills. She denies any nausea or vomiting today or chest pain. She just feels very fatigued and has dry cough. She had a reaction to the N-acetylcysteine and was covered in hives. She relates complete resolution of hives today. She is open to monoclonal antibody treatment for COVID-19.         Medications:  Reviewed    Infusion Medications    sodium chloride      lactated ringers 100 mL/hr at 12/20/21 0757     Scheduled Medications    sodium chloride flush  5-40 mL IntraVENous 2 times per day    enoxaparin  30 mg SubCUTAneous Q12H    guaiFENesin  600 mg Oral BID    Vitamin D  2,000 Units Oral Daily    ascorbic acid  500 mg Oral Daily    zinc sulfate  50 mg Oral Daily    [START ON 12/21/2021] azithromycin  500 mg IntraVENous Once    cefTRIAXone (ROCEPHIN) IV  1,000 mg IntraVENous Q24H     PRN Meds: sodium chloride flush, sodium chloride, ondansetron **OR** ondansetron, polyethylene glycol, albuterol sulfate HFA, benzonatate, potassium chloride **OR** potassium alternative oral replacement **OR** potassium chloride      Intake/Output Summary (Last 24 hours) at 12/20/2021 1035  Last data filed at 12/20/2021 0343  Gross per 24 hour   Intake 250 ml   Output --   Net 250 ml       Diet:  ADULT DIET; Clear Liquid    Exam:  /78   Pulse 102   Temp 99.1 °F (37.3 °C) (Oral)   Resp (!) 36   Wt 190 lb (86.2 kg)   SpO2 94%   BMI 41.12 kg/m²     General appearance: No apparent distress, well developed, appears stated age. Eyes:  Pupils equal, round, and reactive to light. Conjunctivae/corneas clear. HENT: Congestion present- Head normal in appearance. External nares normal.  Oral mucosa moist without lesions. Hearing grossly intact. Neck: Supple, with full range of motion. Trachea midline. No gross JVD appreciated. Respiratory:  Normal respiratory effort. dimininshed breath sounds with  crackles-dry cough appreciated  Cardiovascular: Normal rate, regular rhythm with normal S1/S2 without murmurs. No lower extremity edema. Abdomen: Diffuse abdominal tenderness without guarding, no distention   Musculoskeletal: There is no joint swelling or tenderness. Normal tone. No abnormal movements. Skin: Warm and dry. No rashes or lesions. Neurologic:  No focal sensory/motor deficits in the upper and lower extremities. Cranial nerves:  grossly non-focal 2-12. Psychiatric: Alert and oriented, normal insight and thought content. Capillary Refill: Brisk,< 3 seconds. Peripheral Pulses: +2 palpable, equal bilaterally.      Labs:   Recent Labs     12/19/21 2142 12/20/21  0544   WBC 7.5 5.0   HGB 13.0 11.3*   HCT 43.0 36.5*    172     Recent Labs     12/19/21 2142 12/20/21  0544    139   K 3.9 3.9    106   CO2 21* 21*   BUN 9 6*   CREATININE 0.6 0.5   CALCIUM 8.9 7.9*     Recent Labs     12/19/21 2142 12/20/21  0544   * 630*   ALT 1,245* 913*   BILITOT 0.5 0.5   ALKPHOS 112 95     Recent Labs     12/20/21  0235 12/20/21  1009   INR 1.10 1.24*     No results for input(s): Rima Kern in the last 72 hours. Urinalysis:      Lab Results   Component Value Date    NITRU NEGATIVE 05/14/2018    WBCUA 2-4 05/14/2018    WBCUA 0-2 10/30/2011    BACTERIA NONE 05/14/2018    RBCUA 0-2 05/14/2018    BLOODU NEGATIVE 05/14/2018    GLUCOSEU NEGATIVE 05/14/2018       Radiology:  US LIVER   Final Result   Impression:      No acute processes      This document has been electronically signed by: Janet Carpenter MD on    12/20/2021 01:23 AM      XR CHEST (2 VW)   Final Result   Pneumonic infiltrates are scattered throughout both lungs. **This report has been created using voice recognition software. It may contain minor errors which are inherent in voice recognition technology. **      Final report electronically signed by Dr Yessica Munson on 12/19/2021 9:58 PM          Diet: ADULT DIET;  Clear Liquid    DVT prophylaxis: [x] Lovenox                                 [] SCDs                                 [] SQ Heparin                                 [] Encourage ambulation           [] Already on Anticoagulation     Disposition:    [x] Home       [] TCU       [] Rehab       [] Psych       [] SNF       [] [place]       [] Other-    Code Status: Full Code    PT/OT Eval Status: Not currently ordered      Electronically signed by Frances Mueller DPM on 12/20/2021 at 10:35 AM

## 2021-12-20 NOTE — H&P
Hospitalist History and Physical          Patient: Jennifer Yoon  : 1990  MRN: [id number]     Acct: [de-identified]    PCP: BERTO Plasencia CNP  Date of Admission: 2021  Date of Service: Pt seen/examined on 21  and Admitted to Inpatient with expected LOS greater than two midnights due to medical therapy. Assessment and Plan:    Possible Sepsis 2/2 COVID19 with superimposed bacterial infection  2/4 Criteria met: 101F, HR >90    with RR = 20   CXR shows scattered infiltrates bilaterally   Lactic acid and WBC's WNML   1 liter bolus in ED given,  mL/hr   Procal 0.48   Given vancomycin and zithromax in ED, continue Zithromax with addition of rocephin   Will d/c vancomycin for now   CRP, ferritin and LD pending   Decadron 6 mg daily   IS, acapella, and albuterol PRN  Vitamin C, Vitamin D, zinc, mucinix and tessalon        Acute liver injury concerning for possible liver failure 2/2 tylenol toxicity   LFT's markedly elevated- ALT 1,245    Liver Ultrasound unremarkable, INR 1.10   Acetaminophen level WNML however patient endorses taking 2 tablets q4 hours while awake in addition to gokul seltzer supplement containing supplement 2 tablets 325 mg per tablet in am and possibly upwards of 4 at night but minimum 2, 325 mg containing tablets  Unclear of tylenol dosing in q4 tablets- patient states 200 mg but dose unlikely  Nursing staff suspects 500 mg tablets after conversation with patient    Patient has been taking tylenol up to last 5 days of possibly 5- 7 grams daily   In addition patient received 1 gram in ED for febrile illness   Contacted Poison control center for recommendations and management   Advised to start N-acetyl cysteine protocol 150 mg/kg over 1 hour, 50 mg/kg over 4 hours and 100 mg/kg over 16 hours   repeat LFT's labs prior to completion of third dosing.    Will continue to trend LFT's in addition to poison control recommended protocol   Avoid further tylenol products and additional hepatotoxic agents   GI consulted for any additional recommendations- appreciated    Class III obesity   BMI 44.84 kg/m2    =======================================================================      Chief Complaint:  Cough, congestion, fevers, and nausea     History Of Present Illness:  Marga Hopper is a 33 y.o. female  who presents to [facility] with symptoms of cough, congestion, fevers and nausea with some emesis. Patient states symptoms began last Wednesday and have persisted with worsening. She states she has had intermittent spiking fevers but did endorse measured temperatures. Patient was found to have markedly elevated LFT's. She states she been taking tylenol for upwards of last 5 days in the form of tylenol tablets every four hours while awake but could not specify the dosing. Believed she was taking 2, 500 mg tablets each time. Additionally she has been taking gokul seltzer supplement containing 325 mg of acetaminophen per tablet with 2 tablets in the am and possibly up to four at night. Patient has has loss of appetite and nausea with vomoting every time she tries to eat or drink since onset of her URI symptoms unclear if tylenol ingestion contributed to GI symptoms. ED course:   Vitals on arrival: 101F, RR 20, , /58, 97% sp02   Labs:     Past Medical History:        Diagnosis Date    Cholelithiases        Past Surgical History:        Procedure Laterality Date    CHOLECYSTECTOMY  04/10/2019    LAP    CHOLECYSTECTOMY, LAPAROSCOPIC N/A 4/10/2019    LAP CHOLECYSTECECTOMY performed by Kelly Duke MD at [facility]         Medications Prior to Admission:   Prior to Admission medications    Not on File       Allergies:  Patient has no known allergies. Social History:    The patient currently lives at home. Tobacco use:   reports that she has quit smoking.  Her smoking use included cigarettes. She smoked 0.50 packs per day. She has never used smokeless tobacco.  Alcohol use:   reports no history of alcohol use. Drug use:  reports no history of drug use. Family History:   as follows:  No family history on file. Review of Systems:   Pertinent positives and negatives as noted in the HPI. All other systems reviewed and negative. Physical Exam:    BP (!) 132/58   Pulse 102   Temp 98.8 °F (37.1 °C) (Oral)   Resp 20   SpO2 97%       General appearance: No apparent distress, well developed, appears stated age. Eyes:  Pupils equal, round, and reactive to light. Conjunctivae/corneas clear. HENT: Congestion present- Head normal in appearance. External nares normal.  Oral mucosa moist without lesions. Hearing grossly intact. Neck: Supple, with full range of motion. Trachea midline. No gross JVD appreciated. Respiratory:  Normal respiratory effort. dimininshed breath sounds with possible crackles- hard to discern due to patient coughing   Cardiovascular: Normal rate, regular rhythm with normal S1/S2 without murmurs. No lower extremity edema. Abdomen: Diffuse abdominal tenderness without guarding, no distention   Musculoskeletal: There is no joint swelling or tenderness. Normal tone. No abnormal movements. Skin: Warm and dry. No rashes or lesions. Neurologic:  No focal sensory/motor deficits in the upper and lower extremities. Cranial nerves:  grossly non-focal 2-12. Psychiatric: Alert and oriented, normal insight and thought content. Capillary Refill: Brisk,< 3 seconds. Peripheral Pulses: +2 palpable, equal bilaterally. Labs:     Recent Labs     12/19/21  2142   WBC 7.5   HGB 13.0   HCT 43.0        Recent Labs     12/19/21  2142      K 3.9      CO2 21*   BUN 9   CREATININE 0.6   CALCIUM 8.9     Recent Labs     12/19/21  2142   *   ALT 1,245*   BILITOT 0.5   ALKPHOS 112     No results for input(s): INR in the last 72 hours.   No results for input(s): Vesta Burger in the last 72 hours. Lab Results   Component Value Date    NITRU NEGATIVE 05/14/2018    WBCUA 2-4 05/14/2018    WBCUA 0-2 10/30/2011    BACTERIA NONE 05/14/2018    RBCUA 0-2 05/14/2018    BLOODU NEGATIVE 05/14/2018    GLUCOSEU NEGATIVE 05/14/2018         Radiology:     US LIVER   Final Result   Impression:      No acute processes      This document has been electronically signed by: Naeem Anand MD on    12/20/2021 01:23 AM      XR CHEST (2 VW)   Final Result   Pneumonic infiltrates are scattered throughout both lungs. **This report has been created using voice recognition software. It may contain minor errors which are inherent in voice recognition technology. **      Final report electronically signed by Dr Ced Wood on 12/19/2021 9:58 PM               Diet: Clear liquid   DVT prophylaxis: lovenox   Code Status: Full code  Disposition: admit to COVID unit    Thank you BERTO Jenkins - NATHANAEL for the opportunity to be involved in this patient's care.     Electronically signed by MARIELA Huitron on 12/20/2021 at 1:55 AM.

## 2021-12-20 NOTE — ED NOTES
ED to inpatient nurses report    Chief Complaint   Patient presents with    Cough    Fever    Concern For COVID-19      Present to ED from home  LOC: alert and orientated to name and place and time  Vital signs   Vitals:    12/19/21 2047 12/20/21 0123 12/20/21 0238   BP: (!) 132/58  129/87   Pulse: 102  100   Resp: 20  20   Temp: 101 °F (38.3 °C) 98.8 °F (37.1 °C)    TempSrc:  Oral    SpO2: 97%  99%      Oxygen Baseline none    Current needs required room air   LDAs:   Peripheral IV 12/20/21 Right Antecubital (Active)   Site Assessment Dry; Intact; Clean 12/20/21 0233   Dressing Status Clean;Dry; Intact 12/20/21 0233     Mobility: Independent  Pending ED orders: none: needs Vanc  Present condition: stable, start acetylcysteine when verified    Our promise was given to patient    Electronically signed by Mireille Britton RN on 12/20/2021 at 3:57 AM       Mireille Britton RN  12/20/21 1824

## 2021-12-20 NOTE — CONSULTS
Consult History & Physical      Patient:  Dwayne Jurado  YOB: 1990  MRN: [id number]     Acct: [de-identified]    Chief Complaint:    Chief Complaint   Patient presents with    Cough    Fever    Concern For COVID-19       Date of Service: Pt seen/examined in consultation on 12/20/2021    History Of Present Illness:      33 y.o. female who we are asked to see/evaluate by Maverick Cain MD for medical management of acute liver injury. HPI from patient, RN, and chart review. She came to the ED yesterday for cough and congestion ongoing for approximately 5 days. Endorses fatigue and decreased appetite. Endorses subjective fever. She was taking 2 Tylenol tablets at home approximately every 4 hours and was taking 2 Motrin tablets approximately every 6 hours for the past 5 days. She was noted to be COVID positive. CXR demonstrated pneumonic infiltrates scattered throughout  Both lungs. WBC within normal limits. She was noted to be febrile, Tmax 101 F. She was noted to have an elevated ALT 1245 and elevated  which are trending down. Acetaminophen level <5.0. However, she was given a dose of acetylcysteine 19313hm once. US liver was negative with a normal appearing liver. Acute hepatitis panel negative. She denies abdominal pain, nausea, vomiting, diarrhea, constipation, melena, and hematochezia. She has a history of laparoscopic cholecystectomy. She thinks maybe she had an EGD in the past, but is unsure. She has never had a colonoscopy.      Past Medical History:    Past Medical History:   Diagnosis Date    Cholelithiases        Home Medications:  Prior to Admission medications    Not on File       Surgical History:  Past Surgical History:   Procedure Laterality Date    CHOLECYSTECTOMY  04/10/2019    LAP    CHOLECYSTECTOMY, LAPAROSCOPIC N/A 4/10/2019    LAP CHOLECYSTECECTOMY performed by Adelaida Fischer MD at [place] [address] History:  No family history on file. Past GI History:  Cholecystectomy    Allergies:  Patient has no known allergies. Social History:   TOBACCO:   reports that she has quit smoking. Her smoking use included cigarettes. She smoked 0.50 packs per day. She has never used smokeless tobacco.  ETOH:   reports no history of alcohol use. Review Of Systems  GENERAL: +fever  EYES:  No  blurred vision, double vision   CARDIOVASCULAR: No chest pain or palpitations. RESPIRATORY:  +cough  GI:  See HPI  MUSCULOSKELETAL: No new painful or swollen joints or myalgias. :   No dysuria or hematuria. SKIN:  No rashes or jaundice. NEUROLOGIC:  No headaches or seizures, numbness or tingling of arms, or legs. PSYCH:  No anxiety or depression. ENDOCRINE:  No polyuria or polydipsia. BLOOD:  No anemia, bleeding disorder, blood or blood product transfusion. PHYSICAL EXAM:  BP (!) 105/53   Pulse 80   Temp 98.3 °F (36.8 °C) (Oral)   Resp 20   Wt 190 lb (86.2 kg)   SpO2 96%   BMI 41.12 kg/m²     General appearance: Acutely ill appearing female  HEENT: Normal cephalic, atraumatic without obvious deformity. Pupils equal, round, and reactive to light. Neck: Supple, with full range of motion. No jugular venous distention. Trachea midline. Respiratory:  Normal respiratory effort. Clear to auscultation, bilaterally without Rales/Wheezes/Rhonchi. Non-productive cough noted. Cardiovascular: Regular rate and rhythm without murmurs, rubs or gallops. Abdomen: Soft, obese, non-tender, non-distended with active bowel sounds. Musculoskeletal: No clubbing, cyanosis or edema bilaterally. Skin: Pink, warm, dry. No rashes or lesions.   Psychiatric: Alert and oriented, thought content appropriate, normal insight    Labs:   Recent Labs     12/20/21  0544   WBC 5.0   HGB 11.3*   HCT 36.5*        Recent Labs     12/20/21  0544      K 3.9      CO2 21*   BUN 6*   CREATININE 0.5   CALCIUM 7.9*     Recent Labs     12/20/21  0544   *   *   BILITOT 0.5   ALKPHOS 95     Recent Labs     12/20/21  1009   INR 1.24*      Ref. Range 12/20/2021 05:20   AMPHETAMINE+METHAMPHETAMINE URINE SCREEN Latest Ref Range: NEGATIVE  Negative   Barbiturate Quant, Ur Latest Ref Range: NEGATIVE  Negative   Benzodiazepine Quant, Ur Latest Ref Range: NEGATIVE  Negative   Cannabinoid Quant, Ur Latest Ref Range: NEGATIVE  Negative   Cocaine Metab Quant, Ur Latest Ref Range: NEGATIVE  Negative   PCP Quant, Ur Latest Ref Range: NEGATIVE  Negative   Opiates, Urine Latest Ref Range: NEGATIVE  Negative   Oxycodone Latest Ref Range: NEGATIVE  Negative      Ref. Range 12/19/2021 21:41   Acetaminophen Level Latest Ref Range: 0.0 - 20.0 ug/mL < 5.0      Ref. Range 12/19/2021 21:41   Hep A IgM Unknown Negative   Hepatitis B Surface Ag Unknown Negative   Hepatitis C Ab Unknown Negative   Hep B Core Ab, IgM Unknown Negative     Radiology:   CXR 12/19/21      Impression   Pneumonic infiltrates are scattered throughout both lungs. US liver 12/20/21  Impression   Impression:       No acute processes     Code Status: Full Code    ASSESSMENT:  1. COVID-19 infection  2. Transaminitis- Tylenol toxicity vs secondary to COVID infection, acetaminophen level unremarkable, US liver unremarkable- trending down  3. H/O cholecystectomy    PLAN:     Monitor HFP   INR in the morning   Advance diet as tolerated   ATBs per primary   Avoid hepatotoxic meds   RN updated   Supportive care per primary team  Will follow    Case reviewed and impression/plan reviewed in collaboration with Dr. Roberto Tomlinson  Electronically signed by BERTO Pickering - CNP on 12/20/2021 at 12:24 PM    [facility]  Thank you for the consultation.

## 2021-12-20 NOTE — ED NOTES
Pt and family moved back to rm 24 for further evaluation. Pt fever resolved at this time, states the cough and headache are most irritating right now. Will update provider.       Florine Scheuermann, RN  12/20/21 8166

## 2021-12-20 NOTE — CARE COORDINATION
12/20/21, 7:15 AM EST  DISCHARGE PLANNING EVALUATION:    Heloise Alpers       Admitted: 12/19/2021/ 2044   Hospital day: 0   Location: 8A-06/006-A Reason for admit: Transaminitis [R74.01]  Pneumonia due to 2019-nCoV [U07.1, J12.82]  Pneumonia due to COVID-19 virus [U07.1, J12.82]  COVID-19 [U07.1]   PMH:  has a past medical history of Cholelithiases. Procedure:   12/20 CXR:   Pneumonic infiltrates are scattered throughout both lungs.       12/20 US liver: no acute process    Barriers to Discharge:  Presented to ED with cough and congestion. + covid. Acute liver injury r/t tylenol use. ALT 1,245. . Acetylcysteine. IV rocephin. Tessalon. IV decadron. Vit C, D, zinc.   PCP: BERTO Mckee - CNP  Readmission Risk Score: 8.2 ( )%    Patient Goals/Plan/Treatment Preferences: Spoke with Tee Ayala, she plans to return home with her . She follows with a PCP and does not anticipate home needs. Transportation/Food Security/Housekeeping Addressed:  No issues identified.

## 2021-12-20 NOTE — ED NOTES
Presents to ER for concern of cough and fever. Reports symptoms started 12/15. Pt reports feelings like she can get air while coughing. Pt febrile. Reports family is sick. unsure of covid 19 exposure.       Oly Marquez RN  12/19/21 2511

## 2021-12-20 NOTE — ED PROVIDER NOTES
[facility] ENCOUNTER          Pt Name: Ryann Chauhan  MRN: [id number]  [place] 1990  Date of evaluation: 12/19/2021  Treating Resident Physician: Shahriar Rivers MD  Supervising Physician: Radha Jeffrey MD    [address] [place] [address]       Chief Complaint   Patient presents with    Cough    Fever    Concern For COVID-19     History obtained from the patient. HISTORY OF PRESENT ILLNESS    HPI  Ryann Chauhan is a 33 y.o. female who presents to the emergency department for evaluation of cough and congestion x5 days. Patient also states she has had increased fatigue and decreased appetite. Patient denies any chest pain, shortness of breath or syncope. The patient has no other acute complaints at this time. REVIEW OF SYSTEMS   Review of Systems   Constitutional: Negative for fatigue and fever. HENT: Negative for ear pain, rhinorrhea and sore throat. Respiratory: Negative for cough, shortness of breath and wheezing. Cardiovascular: Negative for chest pain, palpitations and leg swelling. Gastrointestinal: Positive for nausea. Negative for abdominal distention, constipation, diarrhea and vomiting. Endocrine: Negative for polydipsia and polyuria. Genitourinary: Negative for difficulty urinating and dysuria. Musculoskeletal: Negative for arthralgias. Skin: Negative for color change, pallor and rash. Neurological: Negative for dizziness, seizures, syncope, weakness and numbness.          PAST MEDICAL AND SURGICAL HISTORY     Past Medical History:   Diagnosis Date    Cholelithiases      Past Surgical History:   Procedure Laterality Date    CHOLECYSTECTOMY  04/10/2019    LAP    CHOLECYSTECTOMY, LAPAROSCOPIC N/A 4/10/2019    LAP CHOLECYSTECECTOMY performed by Brigido Dawson MD at [address] [place] [address]           MEDICATIONS     Current Facility-Administered Medications:     0.9 % sodium chloride bolus, 1,000 mL, IntraVENous, Once, Ari Garcia MD, Last Rate: 500 mL/hr at 12/20/21 0235, 1,000 mL at 12/20/21 0235    vancomycin 1000 mg IVPB in 250 mL D5W addavial, 1,000 mg, IntraVENous, Once, Ari Garcia MD    azithromycin (ZITHROMAX) 500 mg in D5W 250ml addavial, 500 mg, IntraVENous, Once, Ari Garcia MD, Last Rate: 250 mL/hr at 12/20/21 0234, 500 mg at 12/20/21 0234    azithromycin (ZITHROMAX) 500 mg in D5W 250ml addavial, 500 mg, IntraVENous, Once, MARIELA Wood    cefTRIAXone (ROCEPHIN) 1000 mg IVPB in 50 mL D5W minibag, 1,000 mg, IntraVENous, Q24H, MARIELA Wood  No current outpatient medications on file. SOCIAL HISTORY     Social History     Social History Narrative    Not on file     Social History     Tobacco Use    Smoking status: Former Smoker     Packs/day: 0.50     Types: Cigarettes    Smokeless tobacco: Never Used   Vaping Use    Vaping Use: Never used   Substance Use Topics    Alcohol use: No    Drug use: No         ALLERGIES   No Known Allergies      FAMILY HISTORY   No family history on file. PREVIOUS RECORDS   Previous records reviewed: today    PHYSICAL EXAM     ED Triage Vitals   BP Temp Temp Source Pulse Resp SpO2 Height Weight   12/19/21 2047 12/19/21 2047 12/20/21 0123 12/19/21 2047 12/19/21 2047 12/19/21 2047 -- --   (!) 132/58 101 °F (38.3 °C) Oral 102 20 97 %       Initial vital signs and nursing assessment reviewed and normal. There is no height or weight on file to calculate BMI. Pulsoximetry is normal per my interpretation. Additional Vital Signs:  Vitals:    12/20/21 0238   BP: 129/87   Pulse: 100   Resp: 20   Temp:    SpO2: 99%       Physical Exam  Constitutional:       Appearance: Normal appearance. HENT:      Head: Normocephalic. Right Ear: External ear normal.      Left Ear: External ear normal.      Nose: Congestion present. Mouth/Throat:      Mouth: Mucous membranes are moist.      Pharynx: Oropharynx is clear. 0.7 (*)     All other components within normal limits   COMPREHENSIVE METABOLIC PANEL W/ REFLEX TO MG FOR LOW K - Abnormal; Notable for the following components:    Glucose 134 (*)     CO2 21 (*)      (*)     Total Protein 8.1 (*)     ALT 1,245 (*)     All other components within normal limits   PROCALCITONIN - Abnormal; Notable for the following components:    Procalcitonin 0.48 (*)     All other components within normal limits   OSMOLALITY - Abnormal; Notable for the following components:    Osmolality Calc 272.6 (*)     All other components within normal limits   RAPID INFLUENZA A/B ANTIGENS   CULTURE, BLOOD 1   CULTURE, BLOOD 2   HCG, SERUM, QUALITATIVE   ANION GAP   GLOMERULAR FILTRATION RATE, ESTIMATED   LIPASE   LACTIC ACID, PLASMA   ACETAMINOPHEN LEVEL   HEPATITIS PANEL, ACUTE   URINE DRUG SCREEN   PROTIME-INR       Radiologic studies results:  US LIVER   Final Result   Impression:      No acute processes      This document has been electronically signed by: Rocky Greene MD on    12/20/2021 01:23 AM      XR CHEST (2 VW)   Final Result   Pneumonic infiltrates are scattered throughout both lungs. **This report has been created using voice recognition software. It may contain minor errors which are inherent in voice recognition technology. **      Final report electronically signed by Dr Siva Johnson on 12/19/2021 9:58 PM          ED Medications administered this visit:   Medications   0.9 % sodium chloride bolus (1,000 mLs IntraVENous New Bag 12/20/21 0235)   vancomycin 1000 mg IVPB in 250 mL D5W addavial (has no administration in time range)   azithromycin (ZITHROMAX) 500 mg in D5W 250ml addavial (500 mg IntraVENous New Bag 12/20/21 0234)   azithromycin (ZITHROMAX) 500 mg in D5W 250ml addavial (has no administration in time range)   cefTRIAXone (ROCEPHIN) 1000 mg IVPB in 50 mL D5W minibag (has no administration in time range)   acetaminophen (TYLENOL) tablet 1,000 mg (1,000 mg Oral Given 12/19/21 2155)   ondansetron (ZOFRAN-ODT) disintegrating tablet 4 mg (4 mg Oral Given 12/19/21 2155)   benzonatate (TESSALON) capsule 100 mg (100 mg Oral Given 12/20/21 0235)         ED COURSE     ED Course as of 12/20/21 0252   Mon Dec 20, 2021   0139 Lymphocytes: 9.2 [CN]   0140 AST(!): 992 [CN]      ED Course User Index  [CN] Matthias Simmons MD     Patient is noted with elevated AST and ALT. Liver ultrasound showed no acute abnormality. Due to concerns for acute liver failure patient will be admitted at this time for further evaluation and treatment. MEDICATION CHANGES     New Prescriptions    No medications on file         FINAL DISPOSITION     Final diagnoses:   Transaminitis   COVID-19   Pneumonia due to 2019-nCoV     Condition: condition: good  Dispo: Admit to med/surg floor      This transcription was electronically signed. Parts of this transcriptions may have been dictated by use of voice recognition software and electronically transcribed, and parts may have been transcribed with the assistance of an ED scribe. The transcription may contain errors not detected in proofreading. Please refer to my supervising physician's documentation if my documentation differs. Electronically Signed: Matthias Simmons MD, 12/20/21, 2:52 AM       Matthias Simmons MD  Resident  12/20/21 0302    Attending Supervising Physician's [address] Statement  I performed a history and physical examination on the patient and discussed the management with the resident physician. I reviewed and agree with the findings and plan as documented in her note unless described otherwise below. Pt presents to the ER with covid symptoms, +elevated procal as well raising concern for superimposed bacterial pneumonia. Pt also found to have elevated ast/alt, unclear etiology. Pt does reports she has been taking 650 mg tylenol 4-5 times per day for 4-5 days, which would not be toxic dose, level pending at time of admission.   Admit, continue to monitor.     Electronically signed by Yolanda Monsalve MD on 12/24/21 at 5:00 PM EST       Sanju Wolff MD  12/24/21 8072

## 2021-12-21 VITALS
OXYGEN SATURATION: 96 % | RESPIRATION RATE: 20 BRPM | BODY MASS INDEX: 41.12 KG/M2 | SYSTOLIC BLOOD PRESSURE: 106 MMHG | HEART RATE: 91 BPM | TEMPERATURE: 98.1 F | DIASTOLIC BLOOD PRESSURE: 68 MMHG | WEIGHT: 190 LBS

## 2021-12-21 LAB
ALBUMIN SERPL-MCNC: 3.5 G/DL (ref 3.5–5.1)
ALP BLD-CCNC: 89 U/L (ref 38–126)
ALT SERPL-CCNC: 612 U/L (ref 11–66)
ANION GAP SERPL CALCULATED.3IONS-SCNC: 13 MEQ/L (ref 8–16)
AST SERPL-CCNC: 284 U/L (ref 5–40)
BASOPHILS # BLD: 0.1 %
BASOPHILS ABSOLUTE: 0 THOU/MM3 (ref 0–0.1)
BILIRUB SERPL-MCNC: 0.2 MG/DL (ref 0.3–1.2)
BUN BLDV-MCNC: 4 MG/DL (ref 7–22)
CALCIUM SERPL-MCNC: 8.1 MG/DL (ref 8.5–10.5)
CHLORIDE BLD-SCNC: 103 MEQ/L (ref 98–111)
CO2: 24 MEQ/L (ref 23–33)
CREAT SERPL-MCNC: 0.6 MG/DL (ref 0.4–1.2)
EOSINOPHIL # BLD: 0 %
EOSINOPHILS ABSOLUTE: 0 THOU/MM3 (ref 0–0.4)
ERYTHROCYTE [DISTWIDTH] IN BLOOD BY AUTOMATED COUNT: 18.5 % (ref 11.5–14.5)
ERYTHROCYTE [DISTWIDTH] IN BLOOD BY AUTOMATED COUNT: 46.8 FL (ref 35–45)
GFR SERPL CREATININE-BSD FRML MDRD: > 90 ML/MIN/1.73M2
GLUCOSE BLD-MCNC: 166 MG/DL (ref 70–108)
HCT VFR BLD CALC: 38.4 % (ref 37–47)
HEMOGLOBIN: 11.6 GM/DL (ref 12–16)
IMMATURE GRANS (ABS): 0.04 THOU/MM3 (ref 0–0.07)
IMMATURE GRANULOCYTES: 0.4 %
INR BLD: 1.15 (ref 0.85–1.13)
LYMPHOCYTES # BLD: 13.1 %
LYMPHOCYTES ABSOLUTE: 1.3 THOU/MM3 (ref 1–4.8)
MCH RBC QN AUTO: 22.1 PG (ref 26–33)
MCHC RBC AUTO-ENTMCNC: 30.2 GM/DL (ref 32.2–35.5)
MCV RBC AUTO: 73.3 FL (ref 81–99)
MONOCYTES # BLD: 6.2 %
MONOCYTES ABSOLUTE: 0.6 THOU/MM3 (ref 0.4–1.3)
NUCLEATED RED BLOOD CELLS: 0 /100 WBC
PLATELET # BLD: 205 THOU/MM3 (ref 130–400)
PMV BLD AUTO: 11.4 FL (ref 9.4–12.4)
POTASSIUM REFLEX MAGNESIUM: 3.7 MEQ/L (ref 3.5–5.2)
RBC # BLD: 5.24 MILL/MM3 (ref 4.2–5.4)
SEG NEUTROPHILS: 80.2 %
SEGMENTED NEUTROPHILS ABSOLUTE COUNT: 8 THOU/MM3 (ref 1.8–7.7)
SODIUM BLD-SCNC: 140 MEQ/L (ref 135–145)
TOTAL PROTEIN: 6.6 G/DL (ref 6.1–8)
WBC # BLD: 10 THOU/MM3 (ref 4.8–10.8)

## 2021-12-21 PROCEDURE — 80053 COMPREHEN METABOLIC PANEL: CPT

## 2021-12-21 PROCEDURE — 99239 HOSP IP/OBS DSCHRG MGMT >30: CPT | Performed by: INTERNAL MEDICINE

## 2021-12-21 PROCEDURE — 6370000000 HC RX 637 (ALT 250 FOR IP): Performed by: PHYSICIAN ASSISTANT

## 2021-12-21 PROCEDURE — 85610 PROTHROMBIN TIME: CPT

## 2021-12-21 PROCEDURE — 36415 COLL VENOUS BLD VENIPUNCTURE: CPT

## 2021-12-21 PROCEDURE — 6360000002 HC RX W HCPCS: Performed by: PHYSICIAN ASSISTANT

## 2021-12-21 PROCEDURE — 85025 COMPLETE CBC W/AUTO DIFF WBC: CPT

## 2021-12-21 PROCEDURE — 2580000003 HC RX 258: Performed by: PHYSICIAN ASSISTANT

## 2021-12-21 RX ORDER — ASCORBIC ACID 500 MG
1000 TABLET ORAL DAILY
Qty: 30 TABLET | Refills: 3 | Status: SHIPPED | OUTPATIENT
Start: 2021-12-22

## 2021-12-21 RX ORDER — DEXAMETHASONE 6 MG/1
6 TABLET ORAL
Qty: 6 TABLET | Refills: 0 | Status: SHIPPED | OUTPATIENT
Start: 2021-12-21 | End: 2021-12-27

## 2021-12-21 RX ORDER — CHOLECALCIFEROL (VITAMIN D3) 50 MCG
2000 TABLET ORAL DAILY
Qty: 60 TABLET | Refills: 0 | Status: SHIPPED | OUTPATIENT
Start: 2021-12-22

## 2021-12-21 RX ORDER — GUAIFENESIN 600 MG/1
600 TABLET, EXTENDED RELEASE ORAL 2 TIMES DAILY
Qty: 14 TABLET | Refills: 0 | Status: SHIPPED | OUTPATIENT
Start: 2021-12-21 | End: 2021-12-28

## 2021-12-21 RX ADMIN — BENZONATATE 100 MG: 100 CAPSULE ORAL at 00:35

## 2021-12-21 RX ADMIN — OXYCODONE HYDROCHLORIDE AND ACETAMINOPHEN 500 MG: 500 TABLET ORAL at 09:01

## 2021-12-21 RX ADMIN — AZITHROMYCIN DIHYDRATE 500 MG: 500 INJECTION, POWDER, LYOPHILIZED, FOR SOLUTION INTRAVENOUS at 00:35

## 2021-12-21 RX ADMIN — SODIUM CHLORIDE, POTASSIUM CHLORIDE, SODIUM LACTATE AND CALCIUM CHLORIDE: 600; 310; 30; 20 INJECTION, SOLUTION INTRAVENOUS at 04:48

## 2021-12-21 RX ADMIN — Medication 50 MG: at 09:01

## 2021-12-21 RX ADMIN — BENZONATATE 100 MG: 100 CAPSULE ORAL at 09:01

## 2021-12-21 RX ADMIN — BENZONATATE 100 MG: 100 CAPSULE ORAL at 14:38

## 2021-12-21 RX ADMIN — ENOXAPARIN SODIUM 30 MG: 100 INJECTION SUBCUTANEOUS at 09:01

## 2021-12-21 RX ADMIN — Medication 2000 UNITS: at 09:01

## 2021-12-21 RX ADMIN — GUAIFENESIN 600 MG: 600 TABLET, EXTENDED RELEASE ORAL at 09:01

## 2021-12-21 RX ADMIN — SODIUM CHLORIDE, PRESERVATIVE FREE 10 ML: 5 INJECTION INTRAVENOUS at 09:00

## 2021-12-21 RX ADMIN — CEFTRIAXONE SODIUM 1000 MG: 1 INJECTION, POWDER, FOR SOLUTION INTRAMUSCULAR; INTRAVENOUS at 04:22

## 2021-12-21 ASSESSMENT — PAIN DESCRIPTION - FREQUENCY: FREQUENCY: INTERMITTENT

## 2021-12-21 ASSESSMENT — PAIN DESCRIPTION - PROGRESSION: CLINICAL_PROGRESSION: GRADUALLY IMPROVING

## 2021-12-21 ASSESSMENT — PAIN SCALES - GENERAL
PAINLEVEL_OUTOF10: 0
PAINLEVEL_OUTOF10: 0
PAINLEVEL_OUTOF10: 5
PAINLEVEL_OUTOF10: 0

## 2021-12-21 ASSESSMENT — PAIN DESCRIPTION - DESCRIPTORS: DESCRIPTORS: PATIENT UNABLE TO DESCRIBE

## 2021-12-21 ASSESSMENT — PAIN DESCRIPTION - ONSET: ONSET: ON-GOING

## 2021-12-21 ASSESSMENT — PAIN DESCRIPTION - PAIN TYPE: TYPE: ACUTE PAIN

## 2021-12-21 ASSESSMENT — PAIN - FUNCTIONAL ASSESSMENT: PAIN_FUNCTIONAL_ASSESSMENT: ACTIVITIES ARE NOT PREVENTED

## 2021-12-21 ASSESSMENT — PAIN DESCRIPTION - ORIENTATION: ORIENTATION: LEFT

## 2021-12-21 ASSESSMENT — PAIN DESCRIPTION - LOCATION: LOCATION: ABDOMEN

## 2021-12-21 NOTE — CARE COORDINATION
12/21/21, 11:44 AM EST    Patient goals/plan/ treatment preferences discussed by  and . Patient goals/plan/ treatment preferences reviewed with patient/ family. Patient/ family verbalize understanding of discharge plan and are in agreement with goal/plan/treatment preferences. Understanding was demonstrated using the teach back method. AVS provided by RN at time of discharge, which includes all necessary medical information pertaining to the patients current course of illness, treatment, post-discharge goals of care, and treatment preferences. Discharging home with , new home O2 needed, referral given to dme, SW will complete [facility] [employer] form. O2 to be delivered later this afternoon. PCP listed is a pediatric CNP, spoke with Dennie Crofts, she is agreeable to new PCP, no preference. Attempted to arrange at resident's clinic, no answer, attempted multiple times and disconnects after ringing for a few minutes. Did call #[phone] line, arranged with [facility] practice as this was first available apt.

## 2021-12-21 NOTE — DISCHARGE SUMMARY
Hospital Medicine Discharge Summary      Patient Identification:   Jan Burnette   : 1990  MRN: [id number]   Account: [de-identified]      Patient's PCP: BERTO Lozano CNP    Admit Date: 2021     Discharge Date:   2021    Admitting Physician: uJstine Piper MD     Discharge Physician: Olamide Saenz DPM     Discharge Diagnoses: Active Hospital Problems    Diagnosis Date Noted    Pneumonia due to COVID-19 virus [U07.1, J12.82] 2021       The patient was seen and examined on day of discharge and this discharge summary is in conjunction with any daily progress note from day of discharge. Hospital Course:   HPI:  Jan Burnette is a 33 y.o. female admitted to [address] [place] [address] on 2021 for possible sepsis secondary to COVID-19 infection with superimposed bacterial pneumonia and acute liver injury secondary to COVID-19 and excessive Tylenol use. Patient presented to [place] [address] ER with symptoms of cough, congestion, fever, and nausea. Patient relates that the symptoms started last Wednesday, worsening until her presentation to the ED. Also relates she has had intermittent fevers. Upon ED presentation, patient was noted to have markedly elevated LFTs. Patient relates she has been taking Tylenol 5 times per day, but could not not recall the dosage. Patient relates she was also taking María-Austin supplement which itself contains 325 mg acetaminophen per tablet at nighttime for symptoms. Patient states she has a loss of appetite, and has been vomiting every time she tried to eat or drink since her symptoms started on Wednesday. Hospital course:  2021: Patient presents to ED for pneumonia due to COVID-19 infection, transaminitis, and fatigue. Patient given 1 L fluid bolus in the ED, and received vancomycin and Zithromax  2021: Patient admitted to floor. Vancomycin discontinued, Zithromax continued with Rocephin.   Patient 12/21/2021 1102  Gross per 24 hour   Intake 4148. 35 ml   Output --   Net 4148. 35 ml         General appearance: No apparent distress, well developed, appears stated age. Eyes:  Pupils equal, round, and reactive to light. Conjunctivae/corneas clear. HENT: Congestion present- Head normal in appearance. External nares normal.  Oral mucosa moist without lesions.  Hearing grossly intact. Neck: Supple, with full range of motion. Trachea midline.  No gross JVD appreciated. Respiratory:  Normal respiratory effort. Dimininshed breath sounds with crackles to bilateral lung fields on auscultation; dry cough appreciated  Cardiovascular: Normal rate, regular rhythm with normal S1/S2 sounds without murmurs appreciated. No lower extremity edema noted. Abdomen: No abdominal tenderness or distension noted  Musculoskeletal: There is no joint swelling or tenderness to bilateral UE or LE. Normal tone. No abnormal movements. Skin: Warm and dry. No rashes or lesions. Neurologic:  No focal sensory/motor deficits in the upper and lower extremities. Cranial nerves: CN II-XII grossly intact.     Psychiatric: Alert and oriented, normal insight and thought content. Capillary Refill: Brisk,< 3 seconds. Peripheral Pulses: +2 palpable, equal bilaterally. Labs:  For convenience and continuity at follow-up the following most recent labs are provided:      CBC:    Lab Results   Component Value Date    WBC 10.0 12/21/2021    HGB 11.6 12/21/2021    HCT 38.4 12/21/2021     12/21/2021       Renal:    Lab Results   Component Value Date     12/21/2021    K 3.7 12/21/2021     12/21/2021    CO2 24 12/21/2021    BUN 4 12/21/2021    CREATININE 0.6 12/21/2021    CALCIUM 8.1 12/21/2021         Significant Diagnostic Studies    Radiology:   US LIVER   Final Result   Impression:      No acute processes      This document has been electronically signed by: Rocky Greene MD on    12/20/2021 01:23 AM      XR CHEST (2 VW) Final Result   Pneumonic infiltrates are scattered throughout both lungs. **This report has been created using voice recognition software. It may contain minor errors which are inherent in voice recognition technology. **      Final report electronically signed by Dr Norris Sierra on 12/19/2021 9:58 PM             Consults:     IP CONSULT TO GI    Disposition:    [x] Home       [] TCU       [] Rehab       [] Psych       [] SNF       [] [facility]       [] Other-    Condition at Discharge: Stable    Code Status:  Full Code     Patient Instructions:    Discharge lab work: None  Activity: activity as tolerated  Diet: ADULT DIET; Full Liquid      Follow-up visits:   BERTO Abad CNP  [address]  [phone]               Discharge Medications:     Current Discharge Medication List           Details   guaiFENesin (MUCINEX) 600 MG extended release tablet Take 1 tablet by mouth 2 times daily for 7 days  Qty: 14 tablet, Refills: 0      ascorbic acid (VITAMIN C) 500 MG tablet Take 2 tablets by mouth daily  Qty: 30 tablet, Refills: 3      Vitamin D (CHOLECALCIFEROL) 50 MCG (2000 UT) TABS tablet Take 1 tablet by mouth daily  Qty: 60 tablet, Refills: 0    Comments: Labeling may look different. 25 mcg=1000 Units. Please double check dosages. dexamethasone (DECADRON) 6 MG tablet Take 1 tablet by mouth daily (with breakfast) for 6 days  Qty: 6 tablet, Refills: 0             Time Spent on discharge is more than 30 minutes in the examination, evaluation, counseling and review of medications and discharge plan. Discharge Medications for PCI/MI (performed or attempted):   ASA:   N/A    Statin:   N/A  ACE/ARB:  N/A   P2Y12 Inhibitor:  N/A   Beta Blocker:   N/A  Nitro SL:   N/A   Cardiac Rehab:  N/A  Dietary Consult:  N/A           Signed: Thank you BERTO Abad CNP for the opportunity to be involved in this patient's care.     Electronically signed by Rex James DPM on 12/21/2021 at 1:01 PM

## 2021-12-21 NOTE — PROGRESS NOTES
PROGRESS NOTE      Patient:  Wilfrido Otoole      Unit/Bed:8A-06/006-A    YOB: 1990    MRN: [id number]       Acct: [de-identified]     PCP: BERTO Kelsey CNP    Date of Admission: 12/19/2021      Assessment/Plan:      Active Hospital Problems    Diagnosis Date Noted    Pneumonia due to COVID-19 virus [U07.1, J12.82] 12/20/2021          COVID-19 infection: Patient was seen this morning on behalf of Dr. Araseli Contreras. Patient has improving mild disease exhibited by being on room air and is not in acute distress. She has fatigue which is consistent with mild disease; however, patient states she feels about 25% better subjectively than the day prior. Patient is 1 day s/p IV Regeneron administration. Her liver injury appears to be improving as well. Patient does endorse some mild lower chest pain with coughing only. Otherwise, patient denies any N/V/F/C/SOB/CP. She has no other complaints at this time. Transaminitis: Patient's INR has returned to normal. Her transaminases are improving as well compared to 1 day prior. She has an alert and oriented mental status without any asterixis. Suspect this was due to mild to moderate Covid and mild APAP toxicity.     Class III obesity   BMI 44.84 kg/m2      Chief Complaint: Cough, congestion, fevers, and nausea     Hospital Course:  12/19/2021 - Wilfrido Otoole is a 33 y.o. female  who presents to [facility] with symptoms of cough, congestion, fevers and nausea with some emesis. Patient states symptoms began last Wednesday and have persisted with worsening. She states she has had intermittent spiking fevers but did endorse measured temperatures. Patient was found to have markedly elevated LFT's. She states she been taking tylenol for upwards of last 5 days in the form of tylenol tablets every four hours while awake but could not specify the dosing. Believed she was taking 2, 500 mg tablets each time.  Additionally she has been taking gokul seltzer supplement containing 325 mg of acetaminophen per tablet with 2 tablets in the am and possibly up to four at night. Patient has has loss of appetite and nausea with vomoting every time she tries to eat or drink since onset of her URI symptoms unclear if tylenol ingestion contributed to GI symptoms. 12/20/2021-LFTs are improving. INR is stable. We are considering monoclonal antibody infusion. We will continue to follow LFTs. GI consult pending. 12/21/2021 -LFTs continue to improve. INR is stable. She states that subjectively she feels about 25% better than the day prior after receiving IV Regeneron. Per GI consult note, patient's transaminitis has sufficiently improved that the patient can follow-up with GI as needed on an outpatient basis. Patient has no other complaints at today's encounter. Subjective:   Patient relates that she feels much better than  yesterday. She denies any current fevers or chills. She denies any nausea, vomiting, chest pain. She states her fatigue has significantly improved, but a dry cough persists. States that she is glad she received the IV Regeneron, as it made her feel much better today.         Medications:  Reviewed    Infusion Medications    sodium chloride      lactated ringers 100 mL/hr at 12/21/21 0448     Scheduled Medications    sodium chloride flush  5-40 mL IntraVENous 2 times per day    enoxaparin  30 mg SubCUTAneous Q12H    guaiFENesin  600 mg Oral BID    Vitamin D  2,000 Units Oral Daily    ascorbic acid  500 mg Oral Daily    zinc sulfate  50 mg Oral Daily    cefTRIAXone (ROCEPHIN) IV  1,000 mg IntraVENous Q24H     PRN Meds: sodium chloride flush, sodium chloride, ondansetron **OR** ondansetron, polyethylene glycol, albuterol sulfate HFA, benzonatate, potassium chloride **OR** potassium alternative oral replacement **OR** potassium chloride      Intake/Output Summary (Last 24 hours) at 12/21/2021 1244  Last data filed at 12/20/21  1009 12/20/21  1818 12/21/21  0527   INR 1.24* 1.07 1.15*     No results for input(s): CKTOTAL, TROPONINI in the last 72 hours. Urinalysis:      Lab Results   Component Value Date    NITRU NEGATIVE 05/14/2018    WBCUA 2-4 05/14/2018    WBCUA 0-2 10/30/2011    BACTERIA NONE 05/14/2018    RBCUA 0-2 05/14/2018    BLOODU NEGATIVE 05/14/2018    GLUCOSEU NEGATIVE 05/14/2018       Radiology:  US LIVER   Final Result   Impression:      No acute processes      This document has been electronically signed by: Radha Rosado MD on    12/20/2021 01:23 AM      XR CHEST (2 VW)   Final Result   Pneumonic infiltrates are scattered throughout both lungs. **This report has been created using voice recognition software. It may contain minor errors which are inherent in voice recognition technology. **      Final report electronically signed by Dr Jose Clark on 12/19/2021 9:58 PM          Diet: ADULT DIET;  Full Liquid    DVT prophylaxis: [x] Lovenox                                 [] SCDs                                 [] SQ Heparin                                 [] Encourage ambulation           [] Already on Anticoagulation     Disposition:    [x] Home       [] TCU       [] Rehab       [] Psych       [] SNF       [] [facility]       [] Other-    Code Status: Full Code    PT/OT Eval Status: Not currently ordered      Electronically signed by Ruben Rivera DPM on 12/21/2021 at 12:44 PM

## 2021-12-21 NOTE — PROGRESS NOTES
Patient was evaluated today for the diagnosis of COVID 19 Pneumonia. I entered a DME order for home oxygen because the diagnosis and testing requires the patient to have supplemental oxygen. Condition will improve or be benefited by oxygen use. The patient is  able to perform good mobility in a home setting and therefore does require the use of a portable oxygen system. The need for this equipment was discussed with the patient and she understands and is in agreement.       Electronically signed by Bennie Tobar MD on 12/21/2021 at 11:28 AM

## 2021-12-21 NOTE — PLAN OF CARE
Updated by patient's RN, Boy Nuno, who denies patient having any GI symptoms. She states patient endorses left upper quadrant discomfort with coughing only. She has no abdominal pain otherwise, no nausea or vomiting, no diarrhea, constipation, or blood in the stool. LFTs are consistently trending down.  RN denies needs from GI provider.  - Advance diet as tolerated  - Monitor HFP  - Avoid hepatotoxic meds  - Supportive care per primary team  - Follow-up with GI as needed OP  GI signing off

## 2021-12-21 NOTE — PROCEDURES
A home oxygen evaluation has been completed. [x]Patient is an inpatient. It is expected that the patient will be discharged within the next 48 hours. Qualified provider to write order for home prescription if patient qualifies. Social service/care managers will arrange for home oxygen. If patient is active, arrange for Home Medical supplier to assess for Oxygen Conserving Device per pulse oximetry. []Patient is an outpatient. Results will be faxed to the ordering provider. Qualified provider to write order for home prescription if patient qualifies and arranges for home oxygen. Patient was placed on room air for 20 minutes. SpO2 was 91 % on room air at rest. Patients SpO2 was 89% or above and did not qualify for home oxygen. Patient was walked for 6 minutes. SpO2 was 88 % during walking. Patients SpO2 was below 89% and qualified for home oxygen. Oxygen was applied at 1 lpm via nasal cannula to maintain a SpO2 between 90-92% while walking. Actual SpO2 was 94 %. Note: For any SpO2 at 20% see policy and procedure for possible qualifications.

## 2021-12-21 NOTE — PROGRESS NOTES
Patient educated on discharge instructions, follow up appointments and medications. No concerns voiced at this time. To be discharged with  with all belongings.

## 2021-12-21 NOTE — FLOWSHEET NOTE
Patient let RN know that she was having a reaction to IV medication. Pt presented with rash on face and arms that appeared like hives. Reported her arms felt like 'they ate something spicy' and were 'sleepy'. Vitals were stable at this time. MD notified, Pepcid and Benadryl ordered. Rash had resolved but meds still administered to minimize further reaction. Patient agreeable and MD notified.     Ike Quiñones RN

## 2021-12-22 ENCOUNTER — CARE COORDINATION (OUTPATIENT)
Dept: CASE MANAGEMENT | Age: 31
End: 2021-12-22

## 2021-12-22 NOTE — CARE COORDINATION
Noe 45 Transitions Initial Follow Up Call    Call within 2 business days of discharge: Yes    Patient: Guerline Russell Patient : 1990   MRN: <[id number]>  Reason for Admission: Frankie Shaw Liver Injury  Discharge Date: 21 RARS: Readmission Risk Score: 10.2 ( )  Facility: [address] [place] [address]       Complaint Diagnosis Description Type Department Provider    21 Cough; Fever; Concern For COVID-19 Transaminitis . .. ED to Hosp-Admission (Discharged) (ADMITTED) Baltazar Fischer MD; Adam Allen. .. Future Appointments   Date Time Provider Rukhsana Stewart   1/10/2022  2:00 PM BERTO Vaughn 84     [facility] Language Services: Tanya Chairez # [id number]  Attempt to reach Patient and all HIPAA approved EC for Covid 19 Monitoring discharge call unsuccessful as all numbers listed are disconnected. Episode closed per protocol, no further outreach scheduled. Patient noted to have new patient hospital follow up appt as above.        Will Ramirez RN

## 2021-12-25 LAB
BLOOD CULTURE, ROUTINE: NORMAL
BLOOD CULTURE, ROUTINE: NORMAL

## 2022-11-05 ENCOUNTER — HOSPITAL ENCOUNTER (OUTPATIENT)
Dept: GENERAL RADIOLOGY | Age: 32
Discharge: HOME | End: 2022-11-05

## 2022-11-05 ENCOUNTER — HOSPITAL ENCOUNTER (OUTPATIENT)
Age: 32
Discharge: HOME | End: 2022-11-05

## 2022-11-05 ENCOUNTER — HOSPITAL ENCOUNTER (EMERGENCY)
Age: 32
Discharge: HOME | End: 2022-11-05

## 2022-11-05 VITALS
WEIGHT: 190 LBS | BODY MASS INDEX: 40.99 KG/M2 | HEART RATE: 100 BPM | DIASTOLIC BLOOD PRESSURE: 47 MMHG | TEMPERATURE: 100.4 F | RESPIRATION RATE: 16 BRPM | OXYGEN SATURATION: 100 % | HEIGHT: 57 IN | SYSTOLIC BLOOD PRESSURE: 139 MMHG

## 2022-11-05 DIAGNOSIS — U07.1 COVID-19 VIRUS INFECTION: Primary | ICD-10-CM

## 2022-11-05 DIAGNOSIS — M25.561 RIGHT KNEE PAIN, UNSPECIFIED CHRONICITY: ICD-10-CM

## 2022-11-05 LAB
INFLUENZA A: NOT DETECTED
INFLUENZA B: NOT DETECTED
SARS-COV-2 RNA, RT PCR: DETECTED

## 2022-11-05 PROCEDURE — 99283 EMERGENCY DEPT VISIT LOW MDM: CPT

## 2022-11-05 PROCEDURE — 87636 SARSCOV2 & INF A&B AMP PRB: CPT

## 2022-11-05 PROCEDURE — 6370000000 HC RX 637 (ALT 250 FOR IP): Performed by: PHYSICIAN ASSISTANT

## 2022-11-05 PROCEDURE — 73562 X-RAY EXAM OF KNEE 3: CPT

## 2022-11-05 RX ORDER — ACETAMINOPHEN 325 MG/1
650 TABLET ORAL ONCE
Status: COMPLETED | OUTPATIENT
Start: 2022-11-05 | End: 2022-11-05

## 2022-11-05 RX ADMIN — ACETAMINOPHEN 650 MG: 325 TABLET ORAL at 12:21

## 2022-11-05 ASSESSMENT — PAIN - FUNCTIONAL ASSESSMENT: PAIN_FUNCTIONAL_ASSESSMENT: 0-10

## 2022-11-05 ASSESSMENT — ENCOUNTER SYMPTOMS
DIARRHEA: 0
SINUS PRESSURE: 0
SORE THROAT: 0
NAUSEA: 0
VOMITING: 0
COUGH: 1
ABDOMINAL PAIN: 0
RHINORRHEA: 1
SHORTNESS OF BREATH: 0

## 2022-11-05 ASSESSMENT — PAIN DESCRIPTION - LOCATION: LOCATION: GENERALIZED

## 2022-11-05 ASSESSMENT — PAIN DESCRIPTION - DESCRIPTORS: DESCRIPTORS: ACHING

## 2022-11-05 ASSESSMENT — PAIN SCALES - GENERAL: PAINLEVEL_OUTOF10: 10

## 2022-11-05 ASSESSMENT — PAIN DESCRIPTION - FREQUENCY: FREQUENCY: CONTINUOUS

## 2022-11-05 NOTE — ED PROVIDER NOTES
[address] [facility] [address] [address] EMERGENCY DEPT      CHIEF COMPLAINT       Chief Complaint   Patient presents with    Fever    Cough    Chills       Nurses Notes reviewed and I agree except as noted in the HPI. HISTORY OF PRESENT ILLNESS    Kassidy Johnson is a 27 y.o. female who presents for illness. Patient had gradual onset 3 days ago of headache, rhinorrhea, then progressive cough, fever, chills, and decreased appetite. Fever is not resolving despite Motrin 400 mg. Patient denies vomiting, diarrhea, chest pain, dyspnea, change in urination, weakness, dizziness, or other complaints. Patient had COVID-pneumonia 1 year ago. Patient does not smoke. Patient has no breathing problems. Patient denies possibility of pregnancy. REVIEW OF SYSTEMS     Review of Systems   Constitutional:  Positive for appetite change, chills, fatigue and fever. Negative for activity change. HENT:  Positive for congestion and rhinorrhea. Negative for ear pain, sinus pressure and sore throat. Respiratory:  Positive for cough. Negative for shortness of breath. Cardiovascular:  Negative for chest pain. Gastrointestinal:  Negative for abdominal pain, diarrhea, nausea and vomiting. Endocrine: Negative for polyuria. Genitourinary:  Negative for decreased urine volume, dysuria and frequency. Musculoskeletal:  Negative for gait problem and myalgias. Skin:  Negative for rash. Neurological:  Positive for headaches. Negative for dizziness, weakness and light-headedness. Hematological:  Negative for adenopathy. Psychiatric/Behavioral:  Negative for confusion and sleep disturbance. PAST MEDICAL HISTORY    has a past medical history of Cholelithiases. SURGICAL HISTORY      has a past surgical history that includes Dilation and curettage of uterus; Cholecystectomy (04/10/2019); and Cholecystectomy, laparoscopic (N/A, 4/10/2019).     CURRENT MEDICATIONS       Previous Medications    ASCORBIC ACID (VITAMIN C) 500 MG TABLET Take 2 tablets by mouth daily    VITAMIN D (CHOLECALCIFEROL) 50 MCG (2000 UT) TABS TABLET    Take 1 tablet by mouth daily       ALLERGIES     has No Known Allergies. FAMILY HISTORY     She indicated that her mother is alive. She indicated that her father is alive. family history is not on file. SOCIAL HISTORY    reports that she has quit smoking. Her smoking use included cigarettes. She smoked an average of .5 packs per day. She has never used smokeless tobacco. She reports that she does not drink alcohol and does not use drugs. PHYSICAL EXAM     INITIAL VITALS:  height is 4' 9\" (1.448 m) and weight is 190 lb (86.2 kg). Her oral temperature is 100.4 °F (38 °C). Her blood pressure is 139/47 (abnormal) and her pulse is 100. Her respiration is 16 and oxygen saturation is 100%. Physical Exam  Vitals and nursing note reviewed. Constitutional:       General: She is not in acute distress. Appearance: She is well-developed. She is morbidly obese. She is not toxic-appearing or diaphoretic. HENT:      Head: Normocephalic and atraumatic. Right Ear: Hearing normal.      Left Ear: Hearing normal.      Nose: Nose normal. No rhinorrhea. Mouth/Throat:      Pharynx: Uvula midline. No oropharyngeal exudate. Eyes:      General: Lids are normal. No scleral icterus. Conjunctiva/sclera: Conjunctivae normal.      Pupils: Pupils are equal, round, and reactive to light. Neck:      Trachea: No tracheal deviation. Cardiovascular:      Rate and Rhythm: Normal rate and regular rhythm. Heart sounds: Normal heart sounds. No murmur heard. Pulmonary:      Effort: Pulmonary effort is normal. No respiratory distress. Breath sounds: Normal breath sounds. No stridor. No decreased breath sounds or wheezing. Abdominal:      General: There is no distension. Palpations: Abdomen is soft. Abdomen is not rigid. Tenderness: There is no abdominal tenderness.    Musculoskeletal:         General: Normal range of motion. Cervical back: Normal range of motion and neck supple. No rigidity. Comments: Movement normal as observed   Lymphadenopathy:      Cervical: No cervical adenopathy. Skin:     General: Skin is warm and dry. Coloration: Skin is not pale. Findings: No rash. Neurological:      Mental Status: She is alert and oriented to person, place, and time. Gait: Gait normal.      Comments: No gross deficits observed   Psychiatric:         Mood and Affect: Mood normal.         Speech: Speech normal.         Behavior: Behavior normal.         Thought Content: Thought content normal.       DIFFERENTIAL DIAGNOSIS:   Including but not limited to: COVID, influenza, considered but clinically no appreciated pneumonia    DIAGNOSTIC RESULTS     EKG: All [facility]'s are [facility] who either signs or Co-signs this chart in the absence of a cardiologist.  None    RADIOLOGY: non-plain film images(s) such as CT,Ultrasound and MRI are read by the radiologist.  Plain radiographic images are visualized and preliminarily interpreted by the emergency physician unless otherwise stated below. No orders to display       LABS:   Labs Reviewed   COVID-19 & INFLUENZA COMBO - Abnormal; Notable for the following components:       Result Value    SARS-CoV-2 RNA, RT PCR DETECTED (*)     All other components within normal limits       EMERGENCY DEPARTMENT COURSE:   Vitals:    Vitals:    11/05/22 1116 11/05/22 1217   BP: (!) 139/47    Pulse: (!) 112 100   Resp: 16    Temp: 100.4 °F (38 °C)    TempSrc: Oral    SpO2: 100%    Weight: 190 lb (86.2 kg)    Height: 4' 9\" (1.448 m)            MDM:  The patient was seen and evaluated by me in the intake area. Vital signs were reviewed and noted stable. Physical exam revealed a nontoxic-appearing 17-year-old female with lungs clear to auscultation bilaterally. Franky Ogren Appropriate testing was ordered. Results were reviewed by me upon completion. Results showed positive COVID. Results were discussed with the patient and discharge plan was discussed. I have given the patient strict written and verbal instructions about care at home, follow-up, and signs and symptoms of worsening of condition and they did verbalize understanding.       CRITICAL CARE:   None    CONSULTS:  None    PROCEDURES:  None    FINAL IMPRESSION      1. COVID-19 virus infection          DISPOSITION/PLAN     1. COVID-19 virus infection        PATIENT REFERRED TO:  [facility] EMERGENCY DEPT  [address] [place] [address]  [phone]    If symptoms worsen      DISCHARGE MEDICATIONS:  New Prescriptions    No medications on file       (Please note that portions of this note were completed with a voice recognition program.  Efforts were made to edit the dictations but occasionally words are mis-transcribed.)    Amira Aldridge PA-C 11/05/22 12:23 PM    JOE Moore PA-C  11/05/22 8180

## 2022-11-05 NOTE — Clinical Note
Jada Briones was seen and treated in our emergency department on 11/5/2022. She may return to work on 11/14/2022. If you have any questions or concerns, please don't hesitate to call.       Adán Hu PA-C

## 2022-11-05 NOTE — ED TRIAGE NOTES
Patient presents to ER with complaints of fever, cough, and chills that started 2 days ago. Patient reports last dose of Motrin taken at 1230 this morning.

## 2023-07-26 ENCOUNTER — HOSPITAL ENCOUNTER (OUTPATIENT)
Age: 33
Setting detail: SPECIMEN
Discharge: HOME | End: 2023-07-26

## 2023-07-28 LAB
HPV I/H RISK 4 DNA CVX QL NAA+PROBE: NOT DETECTED
HPV SAMPLE: NORMAL
HPV, INTERPRETATION: NORMAL
HPV16 DNA CVX QL NAA+PROBE: NOT DETECTED
HPV18 DNA CVX QL NAA+PROBE: NOT DETECTED
SPECIMEN DESCRIPTION: NORMAL

## 2023-07-31 LAB
C TRACH DNA SPEC QL PROBE+SIG AMP: NEGATIVE
N GONORRHOEA DNA SPEC QL PROBE+SIG AMP: NEGATIVE
SPECIMEN DESCRIPTION: NORMAL

## 2023-08-04 LAB — CYTOLOGY REPORT: NORMAL

## 2024-04-19 ENCOUNTER — HOSPITAL ENCOUNTER (EMERGENCY)
Age: 34
Discharge: HOME | End: 2024-04-19
Attending: STUDENT IN AN ORGANIZED HEALTH CARE EDUCATION/TRAINING PROGRAM

## 2024-04-19 VITALS
RESPIRATION RATE: 23 BRPM | HEART RATE: 80 BPM | WEIGHT: 182 LBS | SYSTOLIC BLOOD PRESSURE: 117 MMHG | OXYGEN SATURATION: 100 % | DIASTOLIC BLOOD PRESSURE: 70 MMHG | HEIGHT: 55 IN | BODY MASS INDEX: 42.12 KG/M2 | TEMPERATURE: 98.2 F

## 2024-04-19 DIAGNOSIS — J11.1 INFLUENZA WITH RESPIRATORY MANIFESTATION OTHER THAN PNEUMONIA: Primary | ICD-10-CM

## 2024-04-19 LAB
ALBUMIN SERPL BCG-MCNC: 4.2 G/DL (ref 3.5–5.1)
ALP SERPL-CCNC: 100 U/L (ref 38–126)
ALT SERPL W/O P-5'-P-CCNC: 38 U/L (ref 11–66)
ANION GAP SERPL CALC-SCNC: 12 MEQ/L (ref 8–16)
ANISOCYTOSIS BLD QL SMEAR: PRESENT
AST SERPL-CCNC: 35 U/L (ref 5–40)
AUTO DIFF PNL BLD: ABNORMAL
B-HCG SERPL QL: NEGATIVE
BASOPHILS ABSOLUTE: 0 THOU/MM3 (ref 0–0.1)
BASOPHILS NFR BLD AUTO: 0.5 %
BILIRUB SERPL-MCNC: < 0.2 MG/DL (ref 0.3–1.2)
BUN SERPL-MCNC: 11 MG/DL (ref 7–22)
CALCIUM SERPL-MCNC: 8 MG/DL (ref 8.5–10.5)
CHLORIDE SERPL-SCNC: 107 MEQ/L (ref 98–111)
CO2 SERPL-SCNC: 21 MEQ/L (ref 23–33)
CREAT SERPL-MCNC: 0.5 MG/DL (ref 0.4–1.2)
DACROCYTES: ABNORMAL
DEPRECATED RDW RBC AUTO: 43 FL (ref 35–45)
ELLIPTOCYTES: ABNORMAL
EOSINOPHIL NFR BLD AUTO: 3.7 %
EOSINOPHILS ABSOLUTE: 0.2 THOU/MM3 (ref 0–0.4)
ERYTHROCYTE [DISTWIDTH] IN BLOOD BY AUTOMATED COUNT: 20.3 % (ref 11.5–14.5)
FLUAV RNA RESP QL NAA+PROBE: NOT DETECTED
FLUBV RNA RESP QL NAA+PROBE: DETECTED
GFR SERPL CREATININE-BSD FRML MDRD: > 90 ML/MIN/1.73M2
GLUCOSE SERPL-MCNC: 106 MG/DL (ref 70–108)
HCT VFR BLD AUTO: 28.4 % (ref 37–47)
HGB BLD-MCNC: 8 GM/DL (ref 12–16)
HYPOCHROMIA BLD QL SMEAR: PRESENT
IMM GRANULOCYTES # BLD AUTO: 0.01 THOU/MM3 (ref 0–0.07)
IMM GRANULOCYTES NFR BLD AUTO: 0.2 %
LYMPHOCYTES ABSOLUTE: 1.2 THOU/MM3 (ref 1–4.8)
LYMPHOCYTES NFR BLD AUTO: 19.4 %
MCH RBC QN AUTO: 17.3 PG (ref 26–33)
MCHC RBC AUTO-ENTMCNC: 28.2 GM/DL (ref 32.2–35.5)
MCV RBC AUTO: 61.3 FL (ref 81–99)
MICROCYTES BLD QL SMEAR: PRESENT
MONOCYTES ABSOLUTE: 0.6 THOU/MM3 (ref 0.4–1.3)
MONOCYTES NFR BLD AUTO: 9.9 %
NEUTROPHILS NFR BLD AUTO: 66.3 %
NRBC BLD AUTO-RTO: 0 /100 WBC
OSMOLALITY SERPL CALC.SUM OF ELEC: 279.2 MOSMOL/KG (ref 275–300)
PATHOLOGIST REVIEW: ABNORMAL
PLATELET # BLD AUTO: 368 THOU/MM3 (ref 130–400)
PLATELET BLD QL SMEAR: ADEQUATE
PMV BLD AUTO: 10.3 FL (ref 9.4–12.4)
POLYCHROMASIA BLD QL SMEAR: ABNORMAL
POTASSIUM SERPL-SCNC: 3.4 MEQ/L (ref 3.5–5.2)
PROT SERPL-MCNC: 7.2 G/DL (ref 6.1–8)
RBC # BLD AUTO: 4.63 MILL/MM3 (ref 4.2–5.4)
SARS-COV-2 RNA RESP QL NAA+PROBE: NOT DETECTED
SCAN OF BLOOD SMEAR: NORMAL
SEGMENTED NEUTROPHILS ABSOLUTE COUNT: 4.2 THOU/MM3 (ref 1.8–7.7)
SODIUM SERPL-SCNC: 140 MEQ/L (ref 135–145)
WBC # BLD AUTO: 6.3 THOU/MM3 (ref 4.8–10.8)

## 2024-04-19 PROCEDURE — 96374 THER/PROPH/DIAG INJ IV PUSH: CPT

## 2024-04-19 PROCEDURE — 6360000002 HC RX W HCPCS

## 2024-04-19 PROCEDURE — 87636 SARSCOV2 & INF A&B AMP PRB: CPT

## 2024-04-19 PROCEDURE — 84703 CHORIONIC GONADOTROPIN ASSAY: CPT

## 2024-04-19 PROCEDURE — 99284 EMERGENCY DEPT VISIT MOD MDM: CPT

## 2024-04-19 PROCEDURE — 80053 COMPREHEN METABOLIC PANEL: CPT

## 2024-04-19 PROCEDURE — 36415 COLL VENOUS BLD VENIPUNCTURE: CPT

## 2024-04-19 PROCEDURE — 96375 TX/PRO/DX INJ NEW DRUG ADDON: CPT

## 2024-04-19 PROCEDURE — 85025 COMPLETE CBC W/AUTO DIFF WBC: CPT

## 2024-04-19 RX ORDER — DIPHENHYDRAMINE HYDROCHLORIDE 50 MG/ML
25 INJECTION INTRAMUSCULAR; INTRAVENOUS ONCE
Status: COMPLETED | OUTPATIENT
Start: 2024-04-19 | End: 2024-04-19

## 2024-04-19 RX ORDER — PROCHLORPERAZINE EDISYLATE 5 MG/ML
10 INJECTION INTRAMUSCULAR; INTRAVENOUS ONCE
Status: COMPLETED | OUTPATIENT
Start: 2024-04-19 | End: 2024-04-19

## 2024-04-19 RX ORDER — ACETAMINOPHEN 325 MG/1
650 TABLET ORAL EVERY 6 HOURS PRN
COMMUNITY

## 2024-04-19 RX ORDER — GUAIFENESIN 400 MG/1
400 TABLET ORAL
COMMUNITY

## 2024-04-19 RX ORDER — KETOROLAC TROMETHAMINE 30 MG/ML
30 INJECTION, SOLUTION INTRAMUSCULAR; INTRAVENOUS ONCE
Status: COMPLETED | OUTPATIENT
Start: 2024-04-19 | End: 2024-04-19

## 2024-04-19 RX ADMIN — KETOROLAC TROMETHAMINE 30 MG: 30 INJECTION, SOLUTION INTRAMUSCULAR at 04:59

## 2024-04-19 RX ADMIN — DIPHENHYDRAMINE HYDROCHLORIDE 25 MG: 50 INJECTION INTRAMUSCULAR; INTRAVENOUS at 04:58

## 2024-04-19 RX ADMIN — PROCHLORPERAZINE EDISYLATE 10 MG: 5 INJECTION INTRAMUSCULAR; INTRAVENOUS at 04:59

## 2024-04-19 ASSESSMENT — PAIN SCALES - GENERAL: PAINLEVEL_OUTOF10: 7

## 2024-04-19 ASSESSMENT — PAIN - FUNCTIONAL ASSESSMENT: PAIN_FUNCTIONAL_ASSESSMENT: 0-10

## 2024-04-19 NOTE — ED TRIAGE NOTES
Pt to ED with c/o of migraines and abdominal pain. Pt states migraine pain 7/10. Pt states abdominal pain 4/10. Pt pain started 4/17/2024 at 1600, then has progressively worsened. Pt took tylenol to relieve pain 4/18/2024 at 1900. The tylenol did not relieve any of it. Abdominal is RUQ. The migraine pain is at the frontal lobe. The pt has no N/V. Dr. At bedside. Pt has even and unlabored respirations.

## 2024-04-19 NOTE — ED PROVIDER NOTES
DIAGNOSES:  Final diagnoses:   Influenza with respiratory manifestation other than pneumonia       Condition: condition: good  Dispo: Discharge to home  DISPOSITION Decision To Discharge 04/19/2024 05:55:43 AM      This transcription was electronically signed. It was dictated by use of voice recognition software and electronically transcribed. The transcription may contain errors not detected in proofreading.

## 2024-04-19 NOTE — ED NOTES
Pt sitting in bed with telemetry placed. Pt has even and unlabored respirations. Pt voices no questions at the times.

## 2024-04-19 NOTE — DISCHARGE INSTRUCTIONS
Use Tylenol and ibuprofen at home to control pain.    Also consider using medications such as Pepto-Bismol for abdominal symptoms.

## (undated) DEVICE — TROCAR ENDOSCP SHFT L100MM DIA5MM DIL TIP ENDOPATH XCEL

## (undated) DEVICE — COUNTER NDL 40 COUNT HLD 70 FOAM BLK ADH W/ MAG

## (undated) DEVICE — APPLIER CLP M L L11.4IN DIA10MM ENDOSCP ROT MULT FOR LIG

## (undated) DEVICE — PUMP SUC IRR TBNG L10FT W/ HNDPC ASSEMB STRYKEFLOW 2

## (undated) DEVICE — TROCAR ENDOSCP L100MM DIA12MM BLNT STBL SL DISP ENDOPATH

## (undated) DEVICE — GARMENT,MEDLINE,DVT,INT,CALF,MED, GEN2: Brand: MEDLINE

## (undated) DEVICE — SKIN AFFIX SURG ADHESIVE 72/CS 0.55ML: Brand: MEDLINE

## (undated) DEVICE — CHLORAPREP 26ML ORANGE

## (undated) DEVICE — GOWN,SIRUS,NONRNF,SETINSLV,XL,20/CS: Brand: MEDLINE

## (undated) DEVICE — EXCEL 10FT (3.05 M) INSUFFLATION TUBING SET WITH 0.1 MICRON FILTER: Brand: EXCEL

## (undated) DEVICE — SOLUTION IV 1000ML 0.9% SOD CHL PH 5 INJ USP VIAFLX PLAS

## (undated) DEVICE — SYRINGE MED 10ML LUERLOCK TIP W/O SFTY DISP

## (undated) DEVICE — WARMER SCP 2 ANTIFOG LAP DISP

## (undated) DEVICE — SURE SET SINGLE BASIN-LF: Brand: MEDLINE INDUSTRIES, INC.

## (undated) DEVICE — GLOVE ORANGE PI 8   MSG9080

## (undated) DEVICE — ELECTROSURGICAL PENCIL BUTTON SWITCH E-Z CLEAN COATED BLADE ELECTRODE 10 FT (3 M) CORD HOLSTER: Brand: MEGADYNE

## (undated) DEVICE — TOWEL,OR,DSP,ST,BLUE,DLX,4/PK,20PK/CS: Brand: MEDLINE

## (undated) DEVICE — GOWN,SIRUS,NON REINFRCD,LARGE,SET IN SL: Brand: MEDLINE

## (undated) DEVICE — KIT,ANTI FOG,W/SPONGE & FLUID,SOFT PACK: Brand: MEDLINE

## (undated) DEVICE — SOLUTION IV 500ML 0.9% SOD CHL PH 5 INJ USP VIAFLX PLAS

## (undated) DEVICE — UNIVERSAL MONOPOLAR LAPAROSCOPIC CABLE 10FT, 4MM PIN CONNECTOR: Brand: CONMED

## (undated) DEVICE — INTENDED FOR TISSUE SEPARATION, AND OTHER PROCEDURES THAT REQUIRE A SHARP SURGICAL BLADE TO PUNCTURE OR CUT.: Brand: BARD-PARKER ® CARBON RIB-BACK BLADES

## (undated) DEVICE — HYPODERMIC SAFETY NEEDLE: Brand: MAGELLAN

## (undated) DEVICE — BAG SPEC REM 224ML W4XL6IN DIA10MM 1 HND GYN DISP ENDOPCH

## (undated) DEVICE — PACK,SET UP,NO DRAPES: Brand: MEDLINE

## (undated) DEVICE — TROCAR ENDOSCP L100MM DIA11MM DIL TIP STBL SL DISP ENDOPATH

## (undated) DEVICE — GLOVE SURG SZ 7 L12IN FNGR THK94MIL TRNSLUC YEL LTX HYDRGEL

## (undated) DEVICE — GAUZE,SPONGE,8"X4",12PLY,XRAY,STRL,LF: Brand: MEDLINE

## (undated) DEVICE — SHEET, T, LAPAROTOMY, STERILE: Brand: MEDLINE